# Patient Record
Sex: FEMALE | Race: OTHER | NOT HISPANIC OR LATINO
[De-identification: names, ages, dates, MRNs, and addresses within clinical notes are randomized per-mention and may not be internally consistent; named-entity substitution may affect disease eponyms.]

---
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
## 2021-01-07 ENCOUNTER — APPOINTMENT (OUTPATIENT)
Dept: NEUROLOGY | Facility: CLINIC | Age: 21
End: 2021-01-07
Payer: COMMERCIAL

## 2021-01-07 VITALS — TEMPERATURE: 98.2 F

## 2021-01-07 PROCEDURE — 95912 NRV CNDJ TEST 11-12 STUDIES: CPT

## 2021-01-07 PROCEDURE — 99072 ADDL SUPL MATRL&STAF TM PHE: CPT

## 2021-01-07 PROCEDURE — 95886 MUSC TEST DONE W/N TEST COMP: CPT

## 2021-01-10 LAB — SARS-COV-2 N GENE NPH QL NAA+PROBE: NOT DETECTED

## 2021-01-13 ENCOUNTER — FORM ENCOUNTER (OUTPATIENT)
Age: 21
End: 2021-01-13

## 2021-01-25 ENCOUNTER — LABORATORY RESULT (OUTPATIENT)
Age: 21
End: 2021-01-25

## 2021-01-25 ENCOUNTER — OUTPATIENT (OUTPATIENT)
Dept: OUTPATIENT SERVICES | Facility: HOSPITAL | Age: 21
LOS: 1 days | Discharge: HOME | End: 2021-01-25
Payer: COMMERCIAL

## 2021-01-25 DIAGNOSIS — R20.8 OTHER DISTURBANCES OF SKIN SENSATION: ICD-10-CM

## 2021-01-25 DIAGNOSIS — M54.5 LOW BACK PAIN: ICD-10-CM

## 2021-01-25 PROCEDURE — 72195 MRI PELVIS W/O DYE: CPT | Mod: 26

## 2021-01-25 PROCEDURE — 72148 MRI LUMBAR SPINE W/O DYE: CPT | Mod: 26

## 2021-03-09 ENCOUNTER — FORM ENCOUNTER (OUTPATIENT)
Age: 21
End: 2021-03-09

## 2021-03-10 ENCOUNTER — OUTPATIENT (OUTPATIENT)
Dept: OUTPATIENT SERVICES | Facility: HOSPITAL | Age: 21
LOS: 1 days | Discharge: HOME | End: 2021-03-10
Payer: COMMERCIAL

## 2021-03-10 ENCOUNTER — APPOINTMENT (OUTPATIENT)
Dept: PSYCHIATRY | Facility: CLINIC | Age: 21
End: 2021-03-10

## 2021-03-10 DIAGNOSIS — G47.33 OBSTRUCTIVE SLEEP APNEA (ADULT) (PEDIATRIC): ICD-10-CM

## 2021-03-10 DIAGNOSIS — F90.0 ATTENTION-DEFICIT HYPERACTIVITY DISORDER, PREDOMINANTLY INATTENTIVE TYPE: ICD-10-CM

## 2021-03-10 DIAGNOSIS — Z99.89 OBSTRUCTIVE SLEEP APNEA (ADULT) (PEDIATRIC): ICD-10-CM

## 2021-03-10 PROCEDURE — 90792 PSYCH DIAG EVAL W/MED SRVCS: CPT

## 2021-03-11 ENCOUNTER — APPOINTMENT (OUTPATIENT)
Dept: PSYCHIATRY | Facility: CLINIC | Age: 21
End: 2021-03-11

## 2021-03-17 ENCOUNTER — OUTPATIENT (OUTPATIENT)
Dept: OUTPATIENT SERVICES | Facility: HOSPITAL | Age: 21
LOS: 1 days | Discharge: HOME | End: 2021-03-17

## 2021-03-17 ENCOUNTER — APPOINTMENT (OUTPATIENT)
Dept: PSYCHIATRY | Facility: CLINIC | Age: 21
End: 2021-03-17
Payer: COMMERCIAL

## 2021-03-17 DIAGNOSIS — F90.0 ATTENTION-DEFICIT HYPERACTIVITY DISORDER, PREDOMINANTLY INATTENTIVE TYPE: ICD-10-CM

## 2021-03-17 PROCEDURE — 99213 OFFICE O/P EST LOW 20 MIN: CPT | Mod: 95

## 2021-03-26 ENCOUNTER — OUTPATIENT (OUTPATIENT)
Dept: OUTPATIENT SERVICES | Facility: HOSPITAL | Age: 21
LOS: 1 days | Discharge: HOME | End: 2021-03-26
Payer: COMMERCIAL

## 2021-03-26 DIAGNOSIS — N83.9 NONINFLAMMATORY DISORDER OF OVARY, FALLOPIAN TUBE AND BROAD LIGAMENT, UNSPECIFIED: ICD-10-CM

## 2021-03-26 PROCEDURE — 76856 US EXAM PELVIC COMPLETE: CPT | Mod: 26

## 2021-03-31 ENCOUNTER — APPOINTMENT (OUTPATIENT)
Dept: PSYCHIATRY | Facility: CLINIC | Age: 21
End: 2021-03-31
Payer: COMMERCIAL

## 2021-03-31 ENCOUNTER — OUTPATIENT (OUTPATIENT)
Dept: OUTPATIENT SERVICES | Facility: HOSPITAL | Age: 21
LOS: 1 days | Discharge: HOME | End: 2021-03-31

## 2021-03-31 DIAGNOSIS — F90.0 ATTENTION-DEFICIT HYPERACTIVITY DISORDER, PREDOMINANTLY INATTENTIVE TYPE: ICD-10-CM

## 2021-03-31 PROCEDURE — 99213 OFFICE O/P EST LOW 20 MIN: CPT | Mod: 95

## 2021-04-07 ENCOUNTER — OUTPATIENT (OUTPATIENT)
Dept: OUTPATIENT SERVICES | Facility: HOSPITAL | Age: 21
LOS: 1 days | Discharge: HOME | End: 2021-04-07

## 2021-04-07 ENCOUNTER — APPOINTMENT (OUTPATIENT)
Dept: PSYCHIATRY | Facility: CLINIC | Age: 21
End: 2021-04-07
Payer: COMMERCIAL

## 2021-04-07 DIAGNOSIS — R41.840 ATTENTION AND CONCENTRATION DEFICIT: ICD-10-CM

## 2021-04-07 PROCEDURE — 99213 OFFICE O/P EST LOW 20 MIN: CPT | Mod: 95

## 2021-04-07 RX ORDER — METHYLPHENIDATE HYDROCHLORIDE 18 MG/1
18 TABLET, EXTENDED RELEASE ORAL DAILY
Qty: 14 | Refills: 0 | Status: DISCONTINUED | OUTPATIENT
Start: 2021-03-22 | End: 2021-04-07

## 2021-04-08 DIAGNOSIS — F90.0 ATTENTION-DEFICIT HYPERACTIVITY DISORDER, PREDOMINANTLY INATTENTIVE TYPE: ICD-10-CM

## 2021-04-14 ENCOUNTER — OUTPATIENT (OUTPATIENT)
Dept: OUTPATIENT SERVICES | Facility: HOSPITAL | Age: 21
LOS: 1 days | Discharge: HOME | End: 2021-04-14

## 2021-04-14 ENCOUNTER — APPOINTMENT (OUTPATIENT)
Dept: PSYCHIATRY | Facility: CLINIC | Age: 21
End: 2021-04-14
Payer: COMMERCIAL

## 2021-04-14 DIAGNOSIS — F90.0 ATTENTION-DEFICIT HYPERACTIVITY DISORDER, PREDOMINANTLY INATTENTIVE TYPE: ICD-10-CM

## 2021-04-14 PROCEDURE — 90833 PSYTX W PT W E/M 30 MIN: CPT | Mod: 95

## 2021-04-14 PROCEDURE — 99213 OFFICE O/P EST LOW 20 MIN: CPT | Mod: 95

## 2021-04-28 ENCOUNTER — APPOINTMENT (OUTPATIENT)
Dept: PSYCHIATRY | Facility: CLINIC | Age: 21
End: 2021-04-28
Payer: COMMERCIAL

## 2021-04-28 ENCOUNTER — OUTPATIENT (OUTPATIENT)
Dept: OUTPATIENT SERVICES | Facility: HOSPITAL | Age: 21
LOS: 1 days | Discharge: HOME | End: 2021-04-28

## 2021-04-28 PROCEDURE — 99213 OFFICE O/P EST LOW 20 MIN: CPT | Mod: 95

## 2021-04-29 DIAGNOSIS — F90.0 ATTENTION-DEFICIT HYPERACTIVITY DISORDER, PREDOMINANTLY INATTENTIVE TYPE: ICD-10-CM

## 2021-05-19 ENCOUNTER — APPOINTMENT (OUTPATIENT)
Dept: PSYCHIATRY | Facility: CLINIC | Age: 21
End: 2021-05-19
Payer: COMMERCIAL

## 2021-05-19 ENCOUNTER — OUTPATIENT (OUTPATIENT)
Dept: OUTPATIENT SERVICES | Facility: HOSPITAL | Age: 21
LOS: 1 days | Discharge: HOME | End: 2021-05-19

## 2021-05-19 DIAGNOSIS — F41.9 ANXIETY DISORDER, UNSPECIFIED: ICD-10-CM

## 2021-05-19 DIAGNOSIS — F90.0 ATTENTION-DEFICIT HYPERACTIVITY DISORDER, PREDOMINANTLY INATTENTIVE TYPE: ICD-10-CM

## 2021-05-19 PROCEDURE — 99213 OFFICE O/P EST LOW 20 MIN: CPT | Mod: 95

## 2021-05-19 PROCEDURE — 90833 PSYTX W PT W E/M 30 MIN: CPT | Mod: 95

## 2021-06-09 ENCOUNTER — OUTPATIENT (OUTPATIENT)
Dept: OUTPATIENT SERVICES | Facility: HOSPITAL | Age: 21
LOS: 1 days | Discharge: HOME | End: 2021-06-09

## 2021-06-09 ENCOUNTER — APPOINTMENT (OUTPATIENT)
Dept: PSYCHIATRY | Facility: CLINIC | Age: 21
End: 2021-06-09
Payer: COMMERCIAL

## 2021-06-09 PROCEDURE — 99213 OFFICE O/P EST LOW 20 MIN: CPT | Mod: 95

## 2021-06-10 DIAGNOSIS — F90.0 ATTENTION-DEFICIT HYPERACTIVITY DISORDER, PREDOMINANTLY INATTENTIVE TYPE: ICD-10-CM

## 2021-06-10 DIAGNOSIS — F41.9 ANXIETY DISORDER, UNSPECIFIED: ICD-10-CM

## 2021-07-07 ENCOUNTER — APPOINTMENT (OUTPATIENT)
Dept: PSYCHIATRY | Facility: CLINIC | Age: 21
End: 2021-07-07
Payer: COMMERCIAL

## 2021-07-07 ENCOUNTER — TRANSCRIPTION ENCOUNTER (OUTPATIENT)
Age: 21
End: 2021-07-07

## 2021-07-07 ENCOUNTER — OUTPATIENT (OUTPATIENT)
Dept: OUTPATIENT SERVICES | Facility: HOSPITAL | Age: 21
LOS: 1 days | Discharge: HOME | End: 2021-07-07

## 2021-07-07 DIAGNOSIS — F90.9 ATTENTION-DEFICIT HYPERACTIVITY DISORDER, UNSPECIFIED TYPE: ICD-10-CM

## 2021-07-07 DIAGNOSIS — F41.9 ANXIETY DISORDER, UNSPECIFIED: ICD-10-CM

## 2021-07-07 PROCEDURE — 99213 OFFICE O/P EST LOW 20 MIN: CPT | Mod: 95

## 2021-08-04 ENCOUNTER — APPOINTMENT (OUTPATIENT)
Dept: PSYCHIATRY | Facility: CLINIC | Age: 21
End: 2021-08-04
Payer: COMMERCIAL

## 2021-08-04 ENCOUNTER — OUTPATIENT (OUTPATIENT)
Dept: OUTPATIENT SERVICES | Facility: HOSPITAL | Age: 21
LOS: 1 days | Discharge: HOME | End: 2021-08-04

## 2021-08-04 DIAGNOSIS — F41.9 ANXIETY DISORDER, UNSPECIFIED: ICD-10-CM

## 2021-08-04 DIAGNOSIS — F90.9 ATTENTION-DEFICIT HYPERACTIVITY DISORDER, UNSPECIFIED TYPE: ICD-10-CM

## 2021-08-04 PROCEDURE — 99213 OFFICE O/P EST LOW 20 MIN: CPT | Mod: 95

## 2021-08-18 ENCOUNTER — LABORATORY RESULT (OUTPATIENT)
Age: 21
End: 2021-08-18

## 2021-08-18 ENCOUNTER — APPOINTMENT (OUTPATIENT)
Dept: PSYCHIATRY | Facility: CLINIC | Age: 21
End: 2021-08-18

## 2021-08-19 ENCOUNTER — RESULT REVIEW (OUTPATIENT)
Age: 21
End: 2021-08-19

## 2021-08-19 ENCOUNTER — OUTPATIENT (OUTPATIENT)
Dept: OUTPATIENT SERVICES | Facility: HOSPITAL | Age: 21
LOS: 1 days | Discharge: HOME | End: 2021-08-19
Payer: COMMERCIAL

## 2021-08-19 DIAGNOSIS — R79.9 ABNORMAL FINDING OF BLOOD CHEMISTRY, UNSPECIFIED: ICD-10-CM

## 2021-08-19 PROCEDURE — 76700 US EXAM ABDOM COMPLETE: CPT | Mod: 26

## 2021-08-23 LAB
COVID-19 SPIKE DOMAIN ANTIBODY INTERPRETATION: POSITIVE
SARS-COV-2 AB SERPL IA-ACNC: >250 U/ML

## 2021-08-25 ENCOUNTER — APPOINTMENT (OUTPATIENT)
Dept: PSYCHIATRY | Facility: CLINIC | Age: 21
End: 2021-08-25
Payer: COMMERCIAL

## 2021-08-25 ENCOUNTER — OUTPATIENT (OUTPATIENT)
Dept: OUTPATIENT SERVICES | Facility: HOSPITAL | Age: 21
LOS: 1 days | Discharge: HOME | End: 2021-08-25

## 2021-08-25 DIAGNOSIS — F90.9 ATTENTION-DEFICIT HYPERACTIVITY DISORDER, UNSPECIFIED TYPE: ICD-10-CM

## 2021-08-25 DIAGNOSIS — F41.9 ANXIETY DISORDER, UNSPECIFIED: ICD-10-CM

## 2021-08-25 PROCEDURE — 99213 OFFICE O/P EST LOW 20 MIN: CPT | Mod: 95

## 2021-08-30 RX ORDER — DEXMETHYLPHENIDATE HYDROCHLORIDE 15 MG/1
15 CAPSULE, EXTENDED RELEASE ORAL DAILY
Qty: 30 | Refills: 0 | Status: ACTIVE | OUTPATIENT
Start: 2021-08-30

## 2021-09-22 DIAGNOSIS — F43.23 ADJUSTMENT DISORDER WITH MIXED ANXIETY AND DEPRESSED MOOD: ICD-10-CM

## 2021-09-23 ENCOUNTER — APPOINTMENT (OUTPATIENT)
Dept: NEUROPSYCHOLOGY | Facility: CLINIC | Age: 21
End: 2021-09-23

## 2021-09-30 ENCOUNTER — APPOINTMENT (OUTPATIENT)
Dept: NEUROPSYCHOLOGY | Facility: CLINIC | Age: 21
End: 2021-09-30

## 2021-10-05 ENCOUNTER — OUTPATIENT (OUTPATIENT)
Dept: OUTPATIENT SERVICES | Facility: HOSPITAL | Age: 21
LOS: 1 days | Discharge: HOME | End: 2021-10-05

## 2021-10-05 ENCOUNTER — APPOINTMENT (OUTPATIENT)
Dept: PSYCHIATRY | Facility: CLINIC | Age: 21
End: 2021-10-05
Payer: COMMERCIAL

## 2021-10-05 DIAGNOSIS — F41.9 ANXIETY DISORDER, UNSPECIFIED: ICD-10-CM

## 2021-10-05 DIAGNOSIS — F90.9 ATTENTION-DEFICIT HYPERACTIVITY DISORDER, UNSPECIFIED TYPE: ICD-10-CM

## 2021-10-05 PROCEDURE — 99213 OFFICE O/P EST LOW 20 MIN: CPT | Mod: 95

## 2021-10-07 ENCOUNTER — APPOINTMENT (OUTPATIENT)
Dept: NEUROPSYCHOLOGY | Facility: CLINIC | Age: 21
End: 2021-10-07

## 2021-10-14 ENCOUNTER — APPOINTMENT (OUTPATIENT)
Dept: NEUROPSYCHOLOGY | Facility: CLINIC | Age: 21
End: 2021-10-14

## 2021-10-21 ENCOUNTER — APPOINTMENT (OUTPATIENT)
Dept: NEUROPSYCHOLOGY | Facility: CLINIC | Age: 21
End: 2021-10-21

## 2021-10-28 ENCOUNTER — APPOINTMENT (OUTPATIENT)
Dept: NEUROPSYCHOLOGY | Facility: CLINIC | Age: 21
End: 2021-10-28

## 2021-11-04 ENCOUNTER — APPOINTMENT (OUTPATIENT)
Dept: NEUROPSYCHOLOGY | Facility: CLINIC | Age: 21
End: 2021-11-04

## 2021-11-11 ENCOUNTER — APPOINTMENT (OUTPATIENT)
Dept: NEUROPSYCHOLOGY | Facility: CLINIC | Age: 21
End: 2021-11-11

## 2021-11-16 ENCOUNTER — OUTPATIENT (OUTPATIENT)
Dept: OUTPATIENT SERVICES | Facility: HOSPITAL | Age: 21
LOS: 1 days | Discharge: HOME | End: 2021-11-16

## 2021-11-16 ENCOUNTER — APPOINTMENT (OUTPATIENT)
Dept: PSYCHIATRY | Facility: CLINIC | Age: 21
End: 2021-11-16
Payer: COMMERCIAL

## 2021-11-16 DIAGNOSIS — F90.9 ATTENTION-DEFICIT HYPERACTIVITY DISORDER, UNSPECIFIED TYPE: ICD-10-CM

## 2021-11-16 DIAGNOSIS — F41.9 ANXIETY DISORDER, UNSPECIFIED: ICD-10-CM

## 2021-11-16 PROCEDURE — 99213 OFFICE O/P EST LOW 20 MIN: CPT | Mod: 95

## 2021-11-18 ENCOUNTER — APPOINTMENT (OUTPATIENT)
Dept: NEUROPSYCHOLOGY | Facility: CLINIC | Age: 21
End: 2021-11-18

## 2021-12-02 ENCOUNTER — APPOINTMENT (OUTPATIENT)
Dept: NEUROPSYCHOLOGY | Facility: CLINIC | Age: 21
End: 2021-12-02

## 2021-12-09 ENCOUNTER — APPOINTMENT (OUTPATIENT)
Dept: NEUROPSYCHOLOGY | Facility: CLINIC | Age: 21
End: 2021-12-09

## 2021-12-14 ENCOUNTER — OUTPATIENT (OUTPATIENT)
Dept: OUTPATIENT SERVICES | Facility: HOSPITAL | Age: 21
LOS: 1 days | Discharge: HOME | End: 2021-12-14

## 2021-12-14 ENCOUNTER — APPOINTMENT (OUTPATIENT)
Dept: PSYCHIATRY | Facility: CLINIC | Age: 21
End: 2021-12-14
Payer: COMMERCIAL

## 2021-12-14 DIAGNOSIS — F90.9 ATTENTION-DEFICIT HYPERACTIVITY DISORDER, UNSPECIFIED TYPE: ICD-10-CM

## 2021-12-14 DIAGNOSIS — F41.9 ANXIETY DISORDER, UNSPECIFIED: ICD-10-CM

## 2021-12-14 PROCEDURE — 99213 OFFICE O/P EST LOW 20 MIN: CPT

## 2021-12-16 ENCOUNTER — APPOINTMENT (OUTPATIENT)
Dept: NEUROPSYCHOLOGY | Facility: CLINIC | Age: 21
End: 2021-12-16

## 2021-12-23 ENCOUNTER — APPOINTMENT (OUTPATIENT)
Dept: NEUROPSYCHOLOGY | Facility: CLINIC | Age: 21
End: 2021-12-23

## 2022-01-06 ENCOUNTER — APPOINTMENT (OUTPATIENT)
Dept: NEUROPSYCHOLOGY | Facility: CLINIC | Age: 22
End: 2022-01-06

## 2022-01-09 LAB — SARS-COV-2 N GENE NPH QL NAA+PROBE: NOT DETECTED

## 2022-01-11 ENCOUNTER — APPOINTMENT (OUTPATIENT)
Dept: PSYCHIATRY | Facility: CLINIC | Age: 22
End: 2022-01-11
Payer: COMMERCIAL

## 2022-01-11 ENCOUNTER — OUTPATIENT (OUTPATIENT)
Dept: OUTPATIENT SERVICES | Facility: HOSPITAL | Age: 22
LOS: 1 days | Discharge: HOME | End: 2022-01-11

## 2022-01-11 PROCEDURE — 99213 OFFICE O/P EST LOW 20 MIN: CPT | Mod: 95

## 2022-01-12 DIAGNOSIS — F90.9 ATTENTION-DEFICIT HYPERACTIVITY DISORDER, UNSPECIFIED TYPE: ICD-10-CM

## 2022-01-12 DIAGNOSIS — F41.9 ANXIETY DISORDER, UNSPECIFIED: ICD-10-CM

## 2022-01-13 ENCOUNTER — APPOINTMENT (OUTPATIENT)
Dept: NEUROPSYCHOLOGY | Facility: CLINIC | Age: 22
End: 2022-01-13

## 2022-01-20 ENCOUNTER — APPOINTMENT (OUTPATIENT)
Dept: NEUROPSYCHOLOGY | Facility: CLINIC | Age: 22
End: 2022-01-20

## 2022-01-20 ENCOUNTER — OUTPATIENT (OUTPATIENT)
Dept: OUTPATIENT SERVICES | Facility: HOSPITAL | Age: 22
LOS: 1 days | Discharge: HOME | End: 2022-01-20

## 2022-01-20 DIAGNOSIS — F41.9 ANXIETY DISORDER, UNSPECIFIED: ICD-10-CM

## 2022-01-20 DIAGNOSIS — F43.25 ADJUSTMENT DISORDER WITH MIXED DISTURBANCE OF EMOTIONS AND CONDUCT: ICD-10-CM

## 2022-01-20 DIAGNOSIS — F43.23 ADJUSTMENT DISORDER WITH MIXED ANXIETY AND DEPRESSED MOOD: ICD-10-CM

## 2022-01-27 ENCOUNTER — APPOINTMENT (OUTPATIENT)
Dept: NEUROPSYCHOLOGY | Facility: CLINIC | Age: 22
End: 2022-01-27

## 2022-02-03 ENCOUNTER — APPOINTMENT (OUTPATIENT)
Dept: NEUROPSYCHOLOGY | Facility: CLINIC | Age: 22
End: 2022-02-03

## 2022-02-08 ENCOUNTER — APPOINTMENT (OUTPATIENT)
Dept: PSYCHIATRY | Facility: CLINIC | Age: 22
End: 2022-02-08
Payer: COMMERCIAL

## 2022-02-08 ENCOUNTER — OUTPATIENT (OUTPATIENT)
Dept: OUTPATIENT SERVICES | Facility: HOSPITAL | Age: 22
LOS: 1 days | Discharge: HOME | End: 2022-02-08

## 2022-02-08 PROCEDURE — 99212 OFFICE O/P EST SF 10 MIN: CPT | Mod: 95

## 2022-02-09 DIAGNOSIS — F41.9 ANXIETY DISORDER, UNSPECIFIED: ICD-10-CM

## 2022-02-09 DIAGNOSIS — F90.9 ATTENTION-DEFICIT HYPERACTIVITY DISORDER, UNSPECIFIED TYPE: ICD-10-CM

## 2022-02-10 ENCOUNTER — APPOINTMENT (OUTPATIENT)
Dept: NEUROPSYCHOLOGY | Facility: CLINIC | Age: 22
End: 2022-02-10

## 2022-02-17 ENCOUNTER — APPOINTMENT (OUTPATIENT)
Dept: NEUROPSYCHOLOGY | Facility: CLINIC | Age: 22
End: 2022-02-17

## 2022-02-22 ENCOUNTER — APPOINTMENT (OUTPATIENT)
Age: 22
End: 2022-02-22
Payer: COMMERCIAL

## 2022-02-22 PROCEDURE — 99441: CPT | Mod: 95

## 2022-02-22 NOTE — HISTORY OF PRESENT ILLNESS
[Home] : at home, [unfilled] , at the time of the visit. [Medical Office: ([facility])___] : at the medical office located in  [Verbal consent obtained from patient] : the patient, [unfilled] [TextBox_4] : MICHAEL COMPLIANT AND BENEFITING, ISSUES WITH APRIA

## 2022-02-24 ENCOUNTER — APPOINTMENT (OUTPATIENT)
Dept: NEUROPSYCHOLOGY | Facility: CLINIC | Age: 22
End: 2022-02-24

## 2022-03-03 ENCOUNTER — APPOINTMENT (OUTPATIENT)
Dept: NEUROPSYCHOLOGY | Facility: CLINIC | Age: 22
End: 2022-03-03

## 2022-03-10 ENCOUNTER — APPOINTMENT (OUTPATIENT)
Dept: NEUROPSYCHOLOGY | Facility: CLINIC | Age: 22
End: 2022-03-10

## 2022-03-15 ENCOUNTER — APPOINTMENT (OUTPATIENT)
Dept: PSYCHIATRY | Facility: CLINIC | Age: 22
End: 2022-03-15
Payer: COMMERCIAL

## 2022-03-15 ENCOUNTER — OUTPATIENT (OUTPATIENT)
Dept: OUTPATIENT SERVICES | Facility: HOSPITAL | Age: 22
LOS: 1 days | Discharge: HOME | End: 2022-03-15

## 2022-03-15 PROCEDURE — 99213 OFFICE O/P EST LOW 20 MIN: CPT | Mod: 95

## 2022-03-17 ENCOUNTER — APPOINTMENT (OUTPATIENT)
Dept: NEUROPSYCHOLOGY | Facility: CLINIC | Age: 22
End: 2022-03-17

## 2022-03-17 DIAGNOSIS — F90.9 ATTENTION-DEFICIT HYPERACTIVITY DISORDER, UNSPECIFIED TYPE: ICD-10-CM

## 2022-03-17 DIAGNOSIS — F41.9 ANXIETY DISORDER, UNSPECIFIED: ICD-10-CM

## 2022-03-24 ENCOUNTER — APPOINTMENT (OUTPATIENT)
Dept: NEUROPSYCHOLOGY | Facility: CLINIC | Age: 22
End: 2022-03-24

## 2022-03-31 ENCOUNTER — APPOINTMENT (OUTPATIENT)
Dept: NEUROPSYCHOLOGY | Facility: CLINIC | Age: 22
End: 2022-03-31

## 2022-04-07 ENCOUNTER — APPOINTMENT (OUTPATIENT)
Dept: NEUROPSYCHOLOGY | Facility: CLINIC | Age: 22
End: 2022-04-07

## 2022-04-12 ENCOUNTER — APPOINTMENT (OUTPATIENT)
Dept: PSYCHIATRY | Facility: CLINIC | Age: 22
End: 2022-04-12
Payer: COMMERCIAL

## 2022-04-12 ENCOUNTER — OUTPATIENT (OUTPATIENT)
Dept: OUTPATIENT SERVICES | Facility: HOSPITAL | Age: 22
LOS: 1 days | Discharge: HOME | End: 2022-04-12

## 2022-04-12 PROCEDURE — 99213 OFFICE O/P EST LOW 20 MIN: CPT | Mod: 95

## 2022-04-13 DIAGNOSIS — F90.9 ATTENTION-DEFICIT HYPERACTIVITY DISORDER, UNSPECIFIED TYPE: ICD-10-CM

## 2022-04-13 DIAGNOSIS — F41.9 ANXIETY DISORDER, UNSPECIFIED: ICD-10-CM

## 2022-04-14 ENCOUNTER — APPOINTMENT (OUTPATIENT)
Dept: NEUROPSYCHOLOGY | Facility: CLINIC | Age: 22
End: 2022-04-14

## 2022-04-21 ENCOUNTER — APPOINTMENT (OUTPATIENT)
Dept: NEUROPSYCHOLOGY | Facility: CLINIC | Age: 22
End: 2022-04-21

## 2022-04-28 ENCOUNTER — APPOINTMENT (OUTPATIENT)
Dept: NEUROPSYCHOLOGY | Facility: CLINIC | Age: 22
End: 2022-04-28

## 2022-05-05 ENCOUNTER — APPOINTMENT (OUTPATIENT)
Dept: NEUROPSYCHOLOGY | Facility: CLINIC | Age: 22
End: 2022-05-05

## 2022-05-12 ENCOUNTER — APPOINTMENT (OUTPATIENT)
Dept: NEUROPSYCHOLOGY | Facility: CLINIC | Age: 22
End: 2022-05-12

## 2022-05-19 ENCOUNTER — APPOINTMENT (OUTPATIENT)
Dept: NEUROPSYCHOLOGY | Facility: CLINIC | Age: 22
End: 2022-05-19

## 2022-05-26 ENCOUNTER — APPOINTMENT (OUTPATIENT)
Dept: NEUROPSYCHOLOGY | Facility: CLINIC | Age: 22
End: 2022-05-26

## 2022-06-01 ENCOUNTER — OUTPATIENT (OUTPATIENT)
Dept: OUTPATIENT SERVICES | Facility: HOSPITAL | Age: 22
LOS: 1 days | Discharge: HOME | End: 2022-06-01

## 2022-06-01 ENCOUNTER — APPOINTMENT (OUTPATIENT)
Dept: PSYCHIATRY | Facility: CLINIC | Age: 22
End: 2022-06-01

## 2022-06-01 PROCEDURE — 99213 OFFICE O/P EST LOW 20 MIN: CPT | Mod: 95

## 2022-06-02 ENCOUNTER — APPOINTMENT (OUTPATIENT)
Dept: NEUROPSYCHOLOGY | Facility: CLINIC | Age: 22
End: 2022-06-02

## 2022-06-02 DIAGNOSIS — F90.9 ATTENTION-DEFICIT HYPERACTIVITY DISORDER, UNSPECIFIED TYPE: ICD-10-CM

## 2022-06-02 DIAGNOSIS — F41.9 ANXIETY DISORDER, UNSPECIFIED: ICD-10-CM

## 2022-06-09 ENCOUNTER — APPOINTMENT (OUTPATIENT)
Dept: NEUROPSYCHOLOGY | Facility: CLINIC | Age: 22
End: 2022-06-09

## 2022-06-16 ENCOUNTER — APPOINTMENT (OUTPATIENT)
Dept: NEUROPSYCHOLOGY | Facility: CLINIC | Age: 22
End: 2022-06-16

## 2022-06-23 ENCOUNTER — APPOINTMENT (OUTPATIENT)
Dept: NEUROPSYCHOLOGY | Facility: CLINIC | Age: 22
End: 2022-06-23

## 2022-06-30 ENCOUNTER — APPOINTMENT (OUTPATIENT)
Dept: NEUROPSYCHOLOGY | Facility: CLINIC | Age: 22
End: 2022-06-30

## 2022-07-14 ENCOUNTER — APPOINTMENT (OUTPATIENT)
Dept: NEUROPSYCHOLOGY | Facility: CLINIC | Age: 22
End: 2022-07-14

## 2022-07-20 ENCOUNTER — OUTPATIENT (OUTPATIENT)
Dept: OUTPATIENT SERVICES | Facility: HOSPITAL | Age: 22
LOS: 1 days | Discharge: HOME | End: 2022-07-20

## 2022-07-20 ENCOUNTER — APPOINTMENT (OUTPATIENT)
Dept: PSYCHIATRY | Facility: CLINIC | Age: 22
End: 2022-07-20

## 2022-07-20 PROCEDURE — 90833 PSYTX W PT W E/M 30 MIN: CPT | Mod: 95

## 2022-07-20 PROCEDURE — 99213 OFFICE O/P EST LOW 20 MIN: CPT | Mod: 95

## 2022-07-21 DIAGNOSIS — F41.9 ANXIETY DISORDER, UNSPECIFIED: ICD-10-CM

## 2022-07-21 DIAGNOSIS — F90.9 ATTENTION-DEFICIT HYPERACTIVITY DISORDER, UNSPECIFIED TYPE: ICD-10-CM

## 2022-07-28 ENCOUNTER — APPOINTMENT (OUTPATIENT)
Dept: NEUROPSYCHOLOGY | Facility: CLINIC | Age: 22
End: 2022-07-28

## 2022-08-01 ENCOUNTER — LABORATORY RESULT (OUTPATIENT)
Age: 22
End: 2022-08-01

## 2022-08-01 DIAGNOSIS — Z00.00 ENCOUNTER FOR GENERAL ADULT MEDICAL EXAMINATION W/OUT ABNORMAL FINDINGS: ICD-10-CM

## 2022-08-10 ENCOUNTER — APPOINTMENT (OUTPATIENT)
Dept: PSYCHIATRY | Facility: CLINIC | Age: 22
End: 2022-08-10

## 2022-08-11 ENCOUNTER — APPOINTMENT (OUTPATIENT)
Dept: NEUROPSYCHOLOGY | Facility: CLINIC | Age: 22
End: 2022-08-11

## 2022-08-25 ENCOUNTER — APPOINTMENT (OUTPATIENT)
Dept: NEUROPSYCHOLOGY | Facility: CLINIC | Age: 22
End: 2022-08-25

## 2022-09-08 ENCOUNTER — APPOINTMENT (OUTPATIENT)
Dept: NEUROPSYCHOLOGY | Facility: CLINIC | Age: 22
End: 2022-09-08

## 2022-09-22 ENCOUNTER — APPOINTMENT (OUTPATIENT)
Dept: NEUROPSYCHOLOGY | Facility: CLINIC | Age: 22
End: 2022-09-22

## 2022-09-30 ENCOUNTER — APPOINTMENT (OUTPATIENT)
Dept: NEUROPSYCHOLOGY | Facility: CLINIC | Age: 22
End: 2022-09-30

## 2022-10-06 ENCOUNTER — APPOINTMENT (OUTPATIENT)
Dept: NEUROPSYCHOLOGY | Facility: CLINIC | Age: 22
End: 2022-10-06

## 2022-10-07 ENCOUNTER — APPOINTMENT (OUTPATIENT)
Dept: CARDIOLOGY | Facility: CLINIC | Age: 22
End: 2022-10-07

## 2022-10-07 PROCEDURE — 93351 STRESS TTE COMPLETE: CPT

## 2022-10-14 ENCOUNTER — APPOINTMENT (OUTPATIENT)
Dept: NEUROPSYCHOLOGY | Facility: CLINIC | Age: 22
End: 2022-10-14

## 2022-10-20 ENCOUNTER — APPOINTMENT (OUTPATIENT)
Dept: NEUROPSYCHOLOGY | Facility: CLINIC | Age: 22
End: 2022-10-20

## 2022-10-28 ENCOUNTER — APPOINTMENT (OUTPATIENT)
Dept: NEUROPSYCHOLOGY | Facility: CLINIC | Age: 22
End: 2022-10-28

## 2022-11-03 ENCOUNTER — APPOINTMENT (OUTPATIENT)
Dept: NEUROPSYCHOLOGY | Facility: CLINIC | Age: 22
End: 2022-11-03

## 2022-11-11 ENCOUNTER — APPOINTMENT (OUTPATIENT)
Dept: NEUROPSYCHOLOGY | Facility: CLINIC | Age: 22
End: 2022-11-11

## 2022-11-17 ENCOUNTER — APPOINTMENT (OUTPATIENT)
Dept: NEUROPSYCHOLOGY | Facility: CLINIC | Age: 22
End: 2022-11-17

## 2022-11-25 ENCOUNTER — APPOINTMENT (OUTPATIENT)
Dept: NEUROPSYCHOLOGY | Facility: CLINIC | Age: 22
End: 2022-11-25

## 2022-12-01 ENCOUNTER — APPOINTMENT (OUTPATIENT)
Dept: NEUROPSYCHOLOGY | Facility: CLINIC | Age: 22
End: 2022-12-01

## 2022-12-04 DIAGNOSIS — J06.9 ACUTE UPPER RESPIRATORY INFECTION, UNSPECIFIED: ICD-10-CM

## 2022-12-04 RX ORDER — AZITHROMYCIN 250 MG/1
250 TABLET, FILM COATED ORAL
Qty: 6 | Refills: 0 | Status: ACTIVE | COMMUNITY
Start: 2022-12-04 | End: 1900-01-01

## 2022-12-15 ENCOUNTER — APPOINTMENT (OUTPATIENT)
Dept: NEUROPSYCHOLOGY | Facility: CLINIC | Age: 22
End: 2022-12-15

## 2022-12-23 ENCOUNTER — APPOINTMENT (OUTPATIENT)
Dept: NEUROPSYCHOLOGY | Facility: CLINIC | Age: 22
End: 2022-12-23

## 2022-12-29 ENCOUNTER — APPOINTMENT (OUTPATIENT)
Dept: NEUROPSYCHOLOGY | Facility: CLINIC | Age: 22
End: 2022-12-29

## 2023-01-06 ENCOUNTER — APPOINTMENT (OUTPATIENT)
Dept: NEUROPSYCHOLOGY | Facility: CLINIC | Age: 23
End: 2023-01-06

## 2023-01-06 LAB
25(OH)D3 SERPL-MCNC: 32 NG/ML
ALBUMIN SERPL ELPH-MCNC: 4 G/DL
ALP BLD-CCNC: 71 U/L
ALT SERPL-CCNC: 13 U/L
ANION GAP SERPL CALC-SCNC: 9 MMOL/L
AST SERPL-CCNC: 12 U/L
BASOPHILS # BLD AUTO: 0.04 K/UL
BASOPHILS NFR BLD AUTO: 0.3 %
BILIRUB SERPL-MCNC: <0.2 MG/DL
BUN SERPL-MCNC: 15 MG/DL
CALCIUM SERPL-MCNC: 9.5 MG/DL
CHLORIDE SERPL-SCNC: 103 MMOL/L
CHOLEST SERPL-MCNC: 184 MG/DL
CO2 SERPL-SCNC: 26 MMOL/L
CREAT SERPL-MCNC: 0.6 MG/DL
EGFR: 131 ML/MIN/1.73M2
EOSINOPHIL # BLD AUTO: 0.3 K/UL
EOSINOPHIL NFR BLD AUTO: 2.5 %
GLUCOSE SERPL-MCNC: 83 MG/DL
HCT VFR BLD CALC: 40.8 %
HDLC SERPL-MCNC: 50 MG/DL
HGB BLD-MCNC: 13.1 G/DL
IMM GRANULOCYTES NFR BLD AUTO: 0.7 %
LDLC SERPL CALC-MCNC: 99 MG/DL
LYMPHOCYTES # BLD AUTO: 4.75 K/UL
LYMPHOCYTES NFR BLD AUTO: 39.7 %
MAN DIFF?: NORMAL
MCHC RBC-ENTMCNC: 28.2 PG
MCHC RBC-ENTMCNC: 32.1 G/DL
MCV RBC AUTO: 87.9 FL
MONOCYTES # BLD AUTO: 0.76 K/UL
MONOCYTES NFR BLD AUTO: 6.3 %
NEUTROPHILS # BLD AUTO: 6.04 K/UL
NEUTROPHILS NFR BLD AUTO: 50.5 %
NONHDLC SERPL-MCNC: 134 MG/DL
PLATELET # BLD AUTO: 330 K/UL
POTASSIUM SERPL-SCNC: 4.3 MMOL/L
PROT SERPL-MCNC: 6.8 G/DL
RBC # BLD: 4.64 M/UL
RBC # FLD: 13.1 %
SODIUM SERPL-SCNC: 138 MMOL/L
T4 FREE SERPL-MCNC: 1.1 NG/DL
TRIGL SERPL-MCNC: 177 MG/DL
TSH SERPL-ACNC: 4.73 UIU/ML
WBC # FLD AUTO: 11.97 K/UL

## 2023-01-09 RX ORDER — AMOXICILLIN AND CLAVULANATE POTASSIUM 875; 125 MG/1; MG/1
875-125 TABLET, COATED ORAL TWICE DAILY
Qty: 20 | Refills: 0 | Status: ACTIVE | COMMUNITY
Start: 2018-06-12 | End: 1900-01-01

## 2023-01-12 LAB
APOLIPOPROTEIN B: 98 MG/DL
CHOLESTEROL, TOTAL: 176 MG/DL
CHOLESTEROL/HDL RATIO: 3.3
HDL CHOLESTEROL: 53 MG/DL
HLD.LARGE SERPL-SCNC: 8044 NMOL/L
LDL MEDIUM: 475 NMOL/L
LDL SERPL QN: 214.4
LDL SERPL-SCNC: 1655 NMOL/L
LDL SMALL SERPL-SCNC: 428 NMOL/L
LDLC REAL SIZE PAT SERPL: ABNORMAL
LDLC SERPL CALC-MCNC: 96
LIPOPROTEIN (A): 246 NMOL/L
NON-HDL CHOLESTEROL: 123
TRIGLYCERIDES: 167 MG/DL

## 2023-01-20 ENCOUNTER — APPOINTMENT (OUTPATIENT)
Dept: NEUROPSYCHOLOGY | Facility: CLINIC | Age: 23
End: 2023-01-20

## 2023-01-27 ENCOUNTER — OUTPATIENT (OUTPATIENT)
Dept: OUTPATIENT SERVICES | Facility: HOSPITAL | Age: 23
LOS: 1 days | End: 2023-01-27

## 2023-01-27 ENCOUNTER — APPOINTMENT (OUTPATIENT)
Dept: NEUROPSYCHOLOGY | Facility: CLINIC | Age: 23
End: 2023-01-27

## 2023-01-27 DIAGNOSIS — F41.9 ANXIETY DISORDER, UNSPECIFIED: ICD-10-CM

## 2023-01-27 DIAGNOSIS — F43.25 ADJUSTMENT DISORDER WITH MIXED DISTURBANCE OF EMOTIONS AND CONDUCT: ICD-10-CM

## 2023-01-27 DIAGNOSIS — F43.23 ADJUSTMENT DISORDER WITH MIXED ANXIETY AND DEPRESSED MOOD: ICD-10-CM

## 2023-01-30 ENCOUNTER — APPOINTMENT (OUTPATIENT)
Dept: NEUROPSYCHOLOGY | Facility: CLINIC | Age: 23
End: 2023-01-30

## 2023-02-03 ENCOUNTER — APPOINTMENT (OUTPATIENT)
Dept: NEUROPSYCHOLOGY | Facility: CLINIC | Age: 23
End: 2023-02-03

## 2023-02-10 ENCOUNTER — OUTPATIENT (OUTPATIENT)
Dept: OUTPATIENT SERVICES | Facility: HOSPITAL | Age: 23
LOS: 1 days | Discharge: HOME | End: 2023-02-10
Payer: COMMERCIAL

## 2023-02-10 ENCOUNTER — APPOINTMENT (OUTPATIENT)
Dept: NEUROPSYCHOLOGY | Facility: CLINIC | Age: 23
End: 2023-02-10

## 2023-02-10 DIAGNOSIS — F43.23 ADJUSTMENT DISORDER WITH MIXED ANXIETY AND DEPRESSED MOOD: ICD-10-CM

## 2023-02-10 PROCEDURE — 90837 PSYTX W PT 60 MINUTES: CPT | Mod: 95

## 2023-02-11 DIAGNOSIS — F43.23 ADJUSTMENT DISORDER WITH MIXED ANXIETY AND DEPRESSED MOOD: ICD-10-CM

## 2023-02-17 ENCOUNTER — APPOINTMENT (OUTPATIENT)
Dept: NEUROPSYCHOLOGY | Facility: CLINIC | Age: 23
End: 2023-02-17

## 2023-02-27 ENCOUNTER — APPOINTMENT (OUTPATIENT)
Dept: NEUROPSYCHOLOGY | Facility: CLINIC | Age: 23
End: 2023-02-27

## 2023-03-03 ENCOUNTER — APPOINTMENT (OUTPATIENT)
Dept: NEUROPSYCHOLOGY | Facility: CLINIC | Age: 23
End: 2023-03-03

## 2023-03-03 ENCOUNTER — OUTPATIENT (OUTPATIENT)
Dept: OUTPATIENT SERVICES | Facility: HOSPITAL | Age: 23
LOS: 1 days | End: 2023-03-03
Payer: COMMERCIAL

## 2023-03-03 DIAGNOSIS — F43.23 ADJUSTMENT DISORDER WITH MIXED ANXIETY AND DEPRESSED MOOD: ICD-10-CM

## 2023-03-03 PROCEDURE — 90837 PSYTX W PT 60 MINUTES: CPT | Mod: 95

## 2023-03-10 ENCOUNTER — APPOINTMENT (OUTPATIENT)
Dept: NEUROPSYCHOLOGY | Facility: CLINIC | Age: 23
End: 2023-03-10

## 2023-03-13 ENCOUNTER — APPOINTMENT (OUTPATIENT)
Dept: NEUROPSYCHOLOGY | Facility: CLINIC | Age: 23
End: 2023-03-13

## 2023-03-17 ENCOUNTER — APPOINTMENT (OUTPATIENT)
Dept: NEUROPSYCHOLOGY | Facility: CLINIC | Age: 23
End: 2023-03-17

## 2023-03-17 ENCOUNTER — OUTPATIENT (OUTPATIENT)
Dept: OUTPATIENT SERVICES | Facility: HOSPITAL | Age: 23
LOS: 1 days | End: 2023-03-17

## 2023-03-17 DIAGNOSIS — F43.23 ADJUSTMENT DISORDER WITH MIXED ANXIETY AND DEPRESSED MOOD: ICD-10-CM

## 2023-03-24 ENCOUNTER — APPOINTMENT (OUTPATIENT)
Dept: NEUROPSYCHOLOGY | Facility: CLINIC | Age: 23
End: 2023-03-24

## 2023-03-27 ENCOUNTER — APPOINTMENT (OUTPATIENT)
Dept: NEUROPSYCHOLOGY | Facility: CLINIC | Age: 23
End: 2023-03-27

## 2023-03-31 ENCOUNTER — APPOINTMENT (OUTPATIENT)
Dept: NEUROPSYCHOLOGY | Facility: CLINIC | Age: 23
End: 2023-03-31

## 2023-03-31 ENCOUNTER — OUTPATIENT (OUTPATIENT)
Dept: OUTPATIENT SERVICES | Facility: HOSPITAL | Age: 23
LOS: 1 days | End: 2023-03-31
Payer: COMMERCIAL

## 2023-03-31 DIAGNOSIS — G31.84 MILD COGNITIVE IMPAIRMENT OF UNCERTAIN OR UNKNOWN ETIOLOGY: ICD-10-CM

## 2023-03-31 PROCEDURE — 90837 PSYTX W PT 60 MINUTES: CPT | Mod: 95

## 2023-04-01 DIAGNOSIS — G31.84 MILD COGNITIVE IMPAIRMENT OF UNCERTAIN OR UNKNOWN ETIOLOGY: ICD-10-CM

## 2023-04-07 ENCOUNTER — APPOINTMENT (OUTPATIENT)
Dept: NEUROPSYCHOLOGY | Facility: CLINIC | Age: 23
End: 2023-04-07

## 2023-04-10 ENCOUNTER — APPOINTMENT (OUTPATIENT)
Dept: NEUROPSYCHOLOGY | Facility: CLINIC | Age: 23
End: 2023-04-10

## 2023-04-14 ENCOUNTER — APPOINTMENT (OUTPATIENT)
Dept: NEUROPSYCHOLOGY | Facility: CLINIC | Age: 23
End: 2023-04-14

## 2023-04-21 ENCOUNTER — APPOINTMENT (OUTPATIENT)
Dept: NEUROPSYCHOLOGY | Facility: CLINIC | Age: 23
End: 2023-04-21

## 2023-04-24 ENCOUNTER — APPOINTMENT (OUTPATIENT)
Dept: NEUROPSYCHOLOGY | Facility: CLINIC | Age: 23
End: 2023-04-24

## 2023-04-26 ENCOUNTER — APPOINTMENT (OUTPATIENT)
Dept: CARDIOLOGY | Facility: CLINIC | Age: 23
End: 2023-04-26
Payer: COMMERCIAL

## 2023-04-26 DIAGNOSIS — F90.0 ATTENTION-DEFICIT HYPERACTIVITY DISORDER, PREDOMINANTLY INATTENTIVE TYPE: ICD-10-CM

## 2023-04-26 DIAGNOSIS — G47.33 OBSTRUCTIVE SLEEP APNEA (ADULT) (PEDIATRIC): ICD-10-CM

## 2023-04-26 PROCEDURE — 99205 OFFICE O/P NEW HI 60 MIN: CPT | Mod: 95

## 2023-04-28 ENCOUNTER — APPOINTMENT (OUTPATIENT)
Dept: NEUROPSYCHOLOGY | Facility: CLINIC | Age: 23
End: 2023-04-28

## 2023-04-28 ENCOUNTER — OUTPATIENT (OUTPATIENT)
Dept: OUTPATIENT SERVICES | Facility: HOSPITAL | Age: 23
LOS: 1 days | End: 2023-04-28
Payer: COMMERCIAL

## 2023-04-28 DIAGNOSIS — G31.84 MILD COGNITIVE IMPAIRMENT OF UNCERTAIN OR UNKNOWN ETIOLOGY: ICD-10-CM

## 2023-04-28 PROCEDURE — 90837 PSYTX W PT 60 MINUTES: CPT | Mod: 95

## 2023-05-05 ENCOUNTER — APPOINTMENT (OUTPATIENT)
Dept: NEUROPSYCHOLOGY | Facility: CLINIC | Age: 23
End: 2023-05-05

## 2023-05-08 ENCOUNTER — APPOINTMENT (OUTPATIENT)
Dept: NEUROPSYCHOLOGY | Facility: CLINIC | Age: 23
End: 2023-05-08

## 2023-05-12 ENCOUNTER — APPOINTMENT (OUTPATIENT)
Dept: NEUROPSYCHOLOGY | Facility: CLINIC | Age: 23
End: 2023-05-12

## 2023-05-15 ENCOUNTER — APPOINTMENT (OUTPATIENT)
Dept: CARDIOLOGY | Facility: CLINIC | Age: 23
End: 2023-05-15
Payer: COMMERCIAL

## 2023-05-15 PROCEDURE — 97802 MEDICAL NUTRITION INDIV IN: CPT | Mod: 95

## 2023-05-16 VITALS — WEIGHT: 167 LBS | BODY MASS INDEX: 30.73 KG/M2 | HEIGHT: 62 IN

## 2023-05-17 ENCOUNTER — APPOINTMENT (OUTPATIENT)
Dept: OTOLARYNGOLOGY | Facility: CLINIC | Age: 23
End: 2023-05-17
Payer: COMMERCIAL

## 2023-05-17 ENCOUNTER — LABORATORY RESULT (OUTPATIENT)
Age: 23
End: 2023-05-17

## 2023-05-17 VITALS — HEIGHT: 62 IN | WEIGHT: 175 LBS | BODY MASS INDEX: 32.2 KG/M2

## 2023-05-17 DIAGNOSIS — H60.502 UNSPECIFIED ACUTE NONINFECTIVE OTITIS EXTERNA, LEFT EAR: ICD-10-CM

## 2023-05-17 DIAGNOSIS — R05.9 COUGH, UNSPECIFIED: ICD-10-CM

## 2023-05-17 PROCEDURE — 99214 OFFICE O/P EST MOD 30 MIN: CPT | Mod: 25

## 2023-05-17 PROCEDURE — 31575 DIAGNOSTIC LARYNGOSCOPY: CPT

## 2023-05-17 PROCEDURE — 69210 REMOVE IMPACTED EAR WAX UNI: CPT

## 2023-05-17 RX ORDER — OFLOXACIN OTIC 3 MG/ML
0.3 SOLUTION AURICULAR (OTIC)
Qty: 1 | Refills: 0 | Status: ACTIVE | COMMUNITY
Start: 2023-05-17 | End: 1900-01-01

## 2023-05-17 NOTE — HISTORY OF PRESENT ILLNESS
[FreeTextEntry1] : Patient returns today c/o otitis externa, left ear discomfort, TMJ, throat discomfort. Past two weeks patient has been sick. Left ear clogged with wax, discharged and pus. Prescribed antibiotics drops, having itchiness, no discharge. Noticed a change in hearing in left ear. Developed a cough and is still coughing. Occasionally having throat dryness, occasional clear or yellow phlegm. Started taking Levothyroxine 25mcg.

## 2023-05-17 NOTE — REASON FOR VISIT
[Subsequent Evaluation] : a subsequent evaluation for [FreeTextEntry2] : otitis externa, right ear pain, TMJ

## 2023-05-17 NOTE — PHYSICAL EXAM
[de-identified] : bilateral impacted wax cleaned with microsuction. Left OE debrided.  [Normal] : mucosa is normal [Midline] : trachea located in midline position

## 2023-05-19 ENCOUNTER — APPOINTMENT (OUTPATIENT)
Dept: NEUROPSYCHOLOGY | Facility: HOSPITAL | Age: 23
End: 2023-05-19

## 2023-05-19 ENCOUNTER — APPOINTMENT (OUTPATIENT)
Dept: NEUROPSYCHOLOGY | Facility: CLINIC | Age: 23
End: 2023-05-19

## 2023-05-19 ENCOUNTER — OUTPATIENT (OUTPATIENT)
Dept: OUTPATIENT SERVICES | Facility: HOSPITAL | Age: 23
LOS: 1 days | End: 2023-05-19
Payer: COMMERCIAL

## 2023-05-19 DIAGNOSIS — G31.84 MILD COGNITIVE IMPAIRMENT OF UNCERTAIN OR UNKNOWN ETIOLOGY: ICD-10-CM

## 2023-05-19 DIAGNOSIS — F43.23 ADJUSTMENT DISORDER WITH MIXED ANXIETY AND DEPRESSED MOOD: ICD-10-CM

## 2023-05-19 PROCEDURE — 90834 PSYTX W PT 45 MINUTES: CPT | Mod: 95

## 2023-05-20 DIAGNOSIS — F43.23 ADJUSTMENT DISORDER WITH MIXED ANXIETY AND DEPRESSED MOOD: ICD-10-CM

## 2023-05-22 ENCOUNTER — APPOINTMENT (OUTPATIENT)
Dept: NEUROPSYCHOLOGY | Facility: CLINIC | Age: 23
End: 2023-05-22

## 2023-05-22 RX ORDER — ACETIC ACID 20 MG/ML
2 SOLUTION AURICULAR (OTIC)
Qty: 1 | Refills: 0 | Status: ACTIVE | COMMUNITY
Start: 2020-06-05 | End: 1900-01-01

## 2023-05-26 ENCOUNTER — APPOINTMENT (OUTPATIENT)
Dept: NEUROPSYCHOLOGY | Facility: CLINIC | Age: 23
End: 2023-05-26

## 2023-05-31 ENCOUNTER — OUTPATIENT (OUTPATIENT)
Dept: OUTPATIENT SERVICES | Facility: HOSPITAL | Age: 23
LOS: 1 days | End: 2023-05-31
Payer: COMMERCIAL

## 2023-05-31 ENCOUNTER — APPOINTMENT (OUTPATIENT)
Dept: NEUROPSYCHOLOGY | Facility: HOSPITAL | Age: 23
End: 2023-05-31

## 2023-05-31 DIAGNOSIS — F43.23 ADJUSTMENT DISORDER WITH MIXED ANXIETY AND DEPRESSED MOOD: ICD-10-CM

## 2023-05-31 PROCEDURE — 90837 PSYTX W PT 60 MINUTES: CPT | Mod: 95

## 2023-06-01 DIAGNOSIS — F43.23 ADJUSTMENT DISORDER WITH MIXED ANXIETY AND DEPRESSED MOOD: ICD-10-CM

## 2023-06-02 ENCOUNTER — APPOINTMENT (OUTPATIENT)
Dept: NEUROPSYCHOLOGY | Facility: CLINIC | Age: 23
End: 2023-06-02

## 2023-06-05 ENCOUNTER — APPOINTMENT (OUTPATIENT)
Dept: NEUROPSYCHOLOGY | Facility: CLINIC | Age: 23
End: 2023-06-05

## 2023-06-09 ENCOUNTER — APPOINTMENT (OUTPATIENT)
Dept: NEUROPSYCHOLOGY | Facility: CLINIC | Age: 23
End: 2023-06-09

## 2023-06-14 ENCOUNTER — APPOINTMENT (OUTPATIENT)
Dept: NEUROPSYCHOLOGY | Facility: HOSPITAL | Age: 23
End: 2023-06-14

## 2023-06-16 ENCOUNTER — APPOINTMENT (OUTPATIENT)
Dept: NEUROPSYCHOLOGY | Facility: CLINIC | Age: 23
End: 2023-06-16

## 2023-06-19 ENCOUNTER — APPOINTMENT (OUTPATIENT)
Dept: OTOLARYNGOLOGY | Facility: CLINIC | Age: 23
End: 2023-06-19
Payer: COMMERCIAL

## 2023-06-19 ENCOUNTER — APPOINTMENT (OUTPATIENT)
Dept: NEUROPSYCHOLOGY | Facility: CLINIC | Age: 23
End: 2023-06-19

## 2023-06-19 VITALS — HEIGHT: 62 IN | BODY MASS INDEX: 32.57 KG/M2 | WEIGHT: 177 LBS

## 2023-06-19 DIAGNOSIS — H61.23 IMPACTED CERUMEN, BILATERAL: ICD-10-CM

## 2023-06-19 PROCEDURE — 99213 OFFICE O/P EST LOW 20 MIN: CPT | Mod: 25

## 2023-06-19 PROCEDURE — 69210 REMOVE IMPACTED EAR WAX UNI: CPT

## 2023-06-19 NOTE — PHYSICAL EXAM
[Midline] : trachea located in midline position [de-identified] : R jaw tenderness on palpation  [de-identified] : Bilateral  obstructing and impacted cerumen removed under otomicroscopy with microinstrumentation. Canal edema with purulent discharge, CSF powder applied [Normal] : no abnormal secretions

## 2023-06-19 NOTE — REASON FOR VISIT
[Subsequent Evaluation] : a subsequent evaluation for [FreeTextEntry2] : acute otitis externa of left ear, coughing

## 2023-06-19 NOTE — HISTORY OF PRESENT ILLNESS
[FreeTextEntry1] : Patient returns today c/o acute otitis externa of left ear, cough. States that she has itchiness in bilateral ears. Left ear pus and right ear pain worsened starting last night. Having right sided jaw pain. Used Ofloxacin with improvement however symptoms returned. Having post nasal drip, using Flonase with some improvement.

## 2023-06-19 NOTE — ASSESSMENT
[FreeTextEntry1] : Debrided today\par Dry ear precautions\par CSF powder applied\par RV 1-2 weeks with myself or Dr. Costa

## 2023-06-23 ENCOUNTER — APPOINTMENT (OUTPATIENT)
Dept: NEUROPSYCHOLOGY | Facility: CLINIC | Age: 23
End: 2023-06-23

## 2023-06-26 ENCOUNTER — APPOINTMENT (OUTPATIENT)
Dept: OTOLARYNGOLOGY | Facility: CLINIC | Age: 23
End: 2023-06-26
Payer: COMMERCIAL

## 2023-06-26 DIAGNOSIS — H60.8X3 OTHER OTITIS EXTERNA, BILATERAL: ICD-10-CM

## 2023-06-26 DIAGNOSIS — H60.393 OTHER INFECTIVE OTITIS EXTERNA, BILATERAL: ICD-10-CM

## 2023-06-26 PROCEDURE — 69210 REMOVE IMPACTED EAR WAX UNI: CPT

## 2023-06-26 PROCEDURE — 99213 OFFICE O/P EST LOW 20 MIN: CPT | Mod: 25

## 2023-06-26 RX ORDER — CLOTRIMAZOLE AND BETAMETHASONE DIPROPIONATE 10; .5 MG/G; MG/G
1-0.05 CREAM TOPICAL
Qty: 1 | Refills: 2 | Status: ACTIVE | COMMUNITY
Start: 2023-06-26 | End: 1900-01-01

## 2023-06-26 NOTE — REASON FOR VISIT
[Subsequent Evaluation] : a subsequent evaluation for [FreeTextEntry2] : acute otitis externa of  both ears

## 2023-06-26 NOTE — HISTORY OF PRESENT ILLNESS
[FreeTextEntry1] : Patient returns today c/o acute otitis externa of  both ears . She has been using  ciprofloxacin drops for 1 week.  She has  improvement with  pain and hearing .  She  has misplaced drops  last night, wasn't able to apply last night and this morning. \par Reports PND, uses Flonase and Allertec with slight improvement. \par C/o of acid reflux and cough, takes prilosec as needed with improvement.

## 2023-06-26 NOTE — PHYSICAL EXAM
[de-identified] : Bilateral  obstructing and impacted cerumen removed under otomicroscopy with microinstrumentation. Canal edema with purulent discharge, CSF powder applied [Normal] : mucosa is normal [Midline] : trachea located in midline position

## 2023-06-28 ENCOUNTER — APPOINTMENT (OUTPATIENT)
Dept: NEUROPSYCHOLOGY | Facility: HOSPITAL | Age: 23
End: 2023-06-28

## 2023-06-29 RX ORDER — LEVOTHYROXINE SODIUM 25 UG/1
25 TABLET ORAL DAILY
Qty: 90 | Refills: 2 | Status: ACTIVE | COMMUNITY
Start: 2023-06-29 | End: 1900-01-01

## 2023-06-30 ENCOUNTER — APPOINTMENT (OUTPATIENT)
Dept: NEUROPSYCHOLOGY | Facility: CLINIC | Age: 23
End: 2023-06-30

## 2023-07-12 ENCOUNTER — APPOINTMENT (OUTPATIENT)
Dept: NEUROPSYCHOLOGY | Facility: HOSPITAL | Age: 23
End: 2023-07-12

## 2023-07-20 ENCOUNTER — APPOINTMENT (OUTPATIENT)
Dept: PODIATRY | Facility: CLINIC | Age: 23
End: 2023-07-20
Payer: COMMERCIAL

## 2023-07-20 VITALS — BODY MASS INDEX: 32.57 KG/M2 | OXYGEN SATURATION: 99 % | HEIGHT: 62 IN | WEIGHT: 177 LBS | HEART RATE: 80 BPM

## 2023-07-20 DIAGNOSIS — M79.672 PAIN IN LEFT FOOT: ICD-10-CM

## 2023-07-20 DIAGNOSIS — M21.41 FLAT FOOT [PES PLANUS] (ACQUIRED), RIGHT FOOT: ICD-10-CM

## 2023-07-20 DIAGNOSIS — M21.42 FLAT FOOT [PES PLANUS] (ACQUIRED), LEFT FOOT: ICD-10-CM

## 2023-07-20 DIAGNOSIS — G89.29 PAIN IN RIGHT FOOT: ICD-10-CM

## 2023-07-20 DIAGNOSIS — G89.29 PAIN IN LEFT FOOT: ICD-10-CM

## 2023-07-20 DIAGNOSIS — M79.671 PAIN IN RIGHT FOOT: ICD-10-CM

## 2023-07-20 PROCEDURE — 99203 OFFICE O/P NEW LOW 30 MIN: CPT

## 2023-07-20 NOTE — PHYSICAL EXAM
[Pes Planus] : pes planus deformity [Bhanu's Test For Radial Artery Patency Bilaterally] : negative bilateral [de-identified] : Semi-r educable pes planus B/L feet\par Collapsed Arch B/L feet \par Gastroc Equinus B/L LE

## 2023-07-20 NOTE — HISTORY OF PRESENT ILLNESS
[FreeTextEntry1] : B/L Flat Feet\par - long Standing\par - Treated with costume made orthotics \par - Occasionally B/L LE pain with activity

## 2023-07-26 ENCOUNTER — APPOINTMENT (OUTPATIENT)
Dept: NEUROPSYCHOLOGY | Facility: HOSPITAL | Age: 23
End: 2023-07-26

## 2023-07-26 ENCOUNTER — OUTPATIENT (OUTPATIENT)
Dept: OUTPATIENT SERVICES | Facility: HOSPITAL | Age: 23
LOS: 1 days | End: 2023-07-26
Payer: COMMERCIAL

## 2023-07-26 DIAGNOSIS — F43.23 ADJUSTMENT DISORDER WITH MIXED ANXIETY AND DEPRESSED MOOD: ICD-10-CM

## 2023-07-26 PROCEDURE — 90837 PSYTX W PT 60 MINUTES: CPT | Mod: 95

## 2023-08-08 ENCOUNTER — APPOINTMENT (OUTPATIENT)
Dept: NEUROPSYCHOLOGY | Facility: HOSPITAL | Age: 23
End: 2023-08-08

## 2023-08-09 ENCOUNTER — APPOINTMENT (OUTPATIENT)
Dept: NEUROPSYCHOLOGY | Facility: HOSPITAL | Age: 23
End: 2023-08-09

## 2023-08-09 ENCOUNTER — OUTPATIENT (OUTPATIENT)
Dept: OUTPATIENT SERVICES | Facility: HOSPITAL | Age: 23
LOS: 1 days | End: 2023-08-09
Payer: COMMERCIAL

## 2023-08-09 DIAGNOSIS — F43.23 ADJUSTMENT DISORDER WITH MIXED ANXIETY AND DEPRESSED MOOD: ICD-10-CM

## 2023-08-09 PROCEDURE — 90837 PSYTX W PT 60 MINUTES: CPT | Mod: 95

## 2023-08-10 DIAGNOSIS — F43.23 ADJUSTMENT DISORDER WITH MIXED ANXIETY AND DEPRESSED MOOD: ICD-10-CM

## 2023-08-23 ENCOUNTER — APPOINTMENT (OUTPATIENT)
Dept: NEUROPSYCHOLOGY | Facility: HOSPITAL | Age: 23
End: 2023-08-23

## 2023-08-29 ENCOUNTER — APPOINTMENT (OUTPATIENT)
Dept: NEUROPSYCHOLOGY | Facility: HOSPITAL | Age: 23
End: 2023-08-29

## 2023-08-29 ENCOUNTER — OUTPATIENT (OUTPATIENT)
Dept: OUTPATIENT SERVICES | Facility: HOSPITAL | Age: 23
LOS: 1 days | End: 2023-08-29
Payer: COMMERCIAL

## 2023-08-29 DIAGNOSIS — F50.9 EATING DISORDER, UNSPECIFIED: ICD-10-CM

## 2023-08-29 DIAGNOSIS — F43.23 ADJUSTMENT DISORDER WITH MIXED ANXIETY AND DEPRESSED MOOD: ICD-10-CM

## 2023-08-29 PROCEDURE — 90837 PSYTX W PT 60 MINUTES: CPT | Mod: 95

## 2023-08-30 DIAGNOSIS — F43.23 ADJUSTMENT DISORDER WITH MIXED ANXIETY AND DEPRESSED MOOD: ICD-10-CM

## 2023-09-06 ENCOUNTER — APPOINTMENT (OUTPATIENT)
Dept: NEUROPSYCHOLOGY | Facility: HOSPITAL | Age: 23
End: 2023-09-06

## 2023-09-12 ENCOUNTER — APPOINTMENT (OUTPATIENT)
Dept: NEUROPSYCHOLOGY | Facility: HOSPITAL | Age: 23
End: 2023-09-12

## 2023-09-20 ENCOUNTER — APPOINTMENT (OUTPATIENT)
Dept: NEUROPSYCHOLOGY | Facility: HOSPITAL | Age: 23
End: 2023-09-20

## 2023-09-26 ENCOUNTER — APPOINTMENT (OUTPATIENT)
Dept: NEUROPSYCHOLOGY | Facility: HOSPITAL | Age: 23
End: 2023-09-26

## 2023-09-26 ENCOUNTER — OUTPATIENT (OUTPATIENT)
Dept: OUTPATIENT SERVICES | Facility: HOSPITAL | Age: 23
LOS: 1 days | End: 2023-09-26
Payer: COMMERCIAL

## 2023-09-26 DIAGNOSIS — F43.23 ADJUSTMENT DISORDER WITH MIXED ANXIETY AND DEPRESSED MOOD: ICD-10-CM

## 2023-09-26 PROCEDURE — 90837 PSYTX W PT 60 MINUTES: CPT | Mod: 95

## 2023-09-27 DIAGNOSIS — F43.23 ADJUSTMENT DISORDER WITH MIXED ANXIETY AND DEPRESSED MOOD: ICD-10-CM

## 2023-10-10 ENCOUNTER — APPOINTMENT (OUTPATIENT)
Dept: NEUROPSYCHOLOGY | Facility: HOSPITAL | Age: 23
End: 2023-10-10

## 2023-10-10 PROBLEM — E78.1 HIGH TRIGLYCERIDES: Status: ACTIVE | Noted: 2022-08-10

## 2023-10-13 ENCOUNTER — APPOINTMENT (OUTPATIENT)
Dept: CARDIOLOGY | Facility: CLINIC | Age: 23
End: 2023-10-13
Payer: COMMERCIAL

## 2023-10-13 DIAGNOSIS — R07.89 OTHER CHEST PAIN: ICD-10-CM

## 2023-10-13 DIAGNOSIS — E78.1 PURE HYPERGLYCERIDEMIA: ICD-10-CM

## 2023-10-13 PROCEDURE — 99215 OFFICE O/P EST HI 40 MIN: CPT | Mod: 95

## 2023-11-24 ENCOUNTER — RESULT REVIEW (OUTPATIENT)
Age: 23
End: 2023-11-24

## 2023-11-24 ENCOUNTER — OUTPATIENT (OUTPATIENT)
Dept: OUTPATIENT SERVICES | Facility: HOSPITAL | Age: 23
LOS: 1 days | End: 2023-11-24
Payer: COMMERCIAL

## 2023-11-24 DIAGNOSIS — Z00.8 ENCOUNTER FOR OTHER GENERAL EXAMINATION: ICD-10-CM

## 2023-11-24 DIAGNOSIS — R07.89 OTHER CHEST PAIN: ICD-10-CM

## 2023-11-24 PROCEDURE — 75571 CT HRT W/O DYE W/CA TEST: CPT | Mod: 26

## 2023-11-24 PROCEDURE — 76830 TRANSVAGINAL US NON-OB: CPT

## 2023-11-24 PROCEDURE — 76856 US EXAM PELVIC COMPLETE: CPT | Mod: 26

## 2023-11-24 PROCEDURE — 75571 CT HRT W/O DYE W/CA TEST: CPT

## 2023-11-24 PROCEDURE — 76856 US EXAM PELVIC COMPLETE: CPT

## 2023-11-24 PROCEDURE — 76830 TRANSVAGINAL US NON-OB: CPT | Mod: 26

## 2023-11-25 DIAGNOSIS — R07.89 OTHER CHEST PAIN: ICD-10-CM

## 2024-01-05 ENCOUNTER — LABORATORY RESULT (OUTPATIENT)
Age: 24
End: 2024-01-05

## 2024-01-22 DIAGNOSIS — E66.9 OBESITY, UNSPECIFIED: ICD-10-CM

## 2024-01-22 DIAGNOSIS — G47.30 SLEEP APNEA, UNSPECIFIED: ICD-10-CM

## 2024-01-22 DIAGNOSIS — E66.9 SLEEP APNEA, UNSPECIFIED: ICD-10-CM

## 2024-01-26 ENCOUNTER — TRANSCRIPTION ENCOUNTER (OUTPATIENT)
Age: 24
End: 2024-01-26

## 2024-03-08 RX ORDER — TIRZEPATIDE 7.5 MG/.5ML
7.5 INJECTION, SOLUTION SUBCUTANEOUS WEEKLY
Qty: 4 | Refills: 1 | Status: ACTIVE | COMMUNITY
Start: 2024-01-22 | End: 1900-01-01

## 2024-03-11 ENCOUNTER — LABORATORY RESULT (OUTPATIENT)
Age: 24
End: 2024-03-11

## 2024-03-13 ENCOUNTER — APPOINTMENT (OUTPATIENT)
Dept: ENDOCRINOLOGY | Facility: CLINIC | Age: 24
End: 2024-03-13
Payer: COMMERCIAL

## 2024-03-13 VITALS
BODY MASS INDEX: 33.68 KG/M2 | OXYGEN SATURATION: 98 % | HEIGHT: 62 IN | DIASTOLIC BLOOD PRESSURE: 70 MMHG | SYSTOLIC BLOOD PRESSURE: 120 MMHG | WEIGHT: 183 LBS

## 2024-03-13 DIAGNOSIS — E66.9 OBESITY, UNSPECIFIED: ICD-10-CM

## 2024-03-13 DIAGNOSIS — E03.9 HYPOTHYROIDISM, UNSPECIFIED: ICD-10-CM

## 2024-03-13 PROCEDURE — 99204 OFFICE O/P NEW MOD 45 MIN: CPT

## 2024-03-13 RX ORDER — ICOSAPENT ETHYL 1 G/1
1 CAPSULE ORAL
Qty: 360 | Refills: 1 | Status: ACTIVE | COMMUNITY
Start: 2022-08-10 | End: 1900-01-01

## 2024-03-13 NOTE — ASSESSMENT
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 , weight gain BMI 33 and DL previously followed by endo at [place]  # hypothyroidism : - in 2021 -2022 was found to have high TSH with normal Ft4 , had symptoms of hypothyroidism and was given Lt4 25 mcg daily - did not notice any change while on or off meds, she stopped taking it 3 weeks ago as she ran out - off levothyroxine for 3 weeks now TSH 3.06 - previously tpo and tg antibodies negative - will stay off meds, redo labs before next visit and recheck antibodies and reassess need for Lt4     # weight gain , BMI 33 - started recently on Zepbound 2.5 mg and dose increased to 5 mg Q week NOW Week 3 , overall tolerating it well and lost around 9 lbs - no PCOS diagnosis periods regular except recent had delay , no pregnancy plans now - no major SE except for some reflux - after 4 th dose of MOunjaro 5 mg Q week she will up to 7.5 mg Q week and stay on this dose until reevaluated   # DL / early CAD in family (mom and dad ) had work up with calcium score 0 LDL ok no statin - is on vascepa 2 g BID, continue

## 2024-03-13 NOTE — HISTORY OF PRESENT ILLNESS
Last Written Prescription Date:  9/15/22  Last Fill Quantity: 90,   Last office visit: 9/6/2022   Future Office Visit:        Called and spoke with patient. She is taking medication 4 times daily.  Please consider changing Disp amount.          
  Medication Question or Refill        What medication are you calling about (include dose and sig)?: hydroxyzine    Controlled Substance Agreement on file:   CSA -- Patient Level:    CSA: None found at the patient level.       Who prescribed the medication?: Dr. Terry    Do you need a refill? Yes: pt has 3 days left    When did you use the medication last? 9/29/2022    Patient offered an appointment? No    Do you have any questions or concerns?  No    Preferred Pharmacy:     [id number] [address] [facility] [address]  [address] [address]  Phone: [phone] Fax: [phone]        Could we send this information to you in Vputi or would you prefer to receive a phone call?:   Patient would prefer a phone call   Okay to leave a detailed message?: Yes at Cell number on file:    Telephone Information:   Mobile [phone]       
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 , weight gain BMI 33 and DL  previously followed by endo at [place]  # hypothyroidism : - in 2021 -2022 was found to have high TSH with normal Ft4 , had symptoms of hypothyroidism and was given Lt4 25 mcg daily  - did not notice any change while on or off meds, she stopped taking it 3 weeks ago as she ran out  - off levothyroxine for 3 weeks now   TSH 3.06 - previously tpo and tg antibodies negative     # weight gain , BMI 33  - started recently on Zepbound 2.5 mg and dose increased to 5 mg Q week NOW Week 3 , overall tolerating it well  and lost around 9 lbs  - no PCOS diagnosis periods regular except recent had delay , no pregnancy plans now  - no major SE except for some reflux   # DL / early CAD in family (mom and dad ) had work up with calcium score 0 LDL ok no statin  - is on vascepa 2 g BID

## 2024-03-13 NOTE — REASON FOR VISIT
[Initial Evaluation] : an initial evaluation [Hypothyroidism] : hypothyroidism [Weight Management/Obesity] : weight management/obesity [Parent] : parent

## 2024-03-13 NOTE — PHYSICAL EXAM
[Alert] : alert [Healthy Appearance] : healthy appearance [No Acute Distress] : no acute distress [No Proptosis] : no proptosis [No Lid Lag] : no lid lag [Thyroid Not Enlarged] : the thyroid was not enlarged [No Respiratory Distress] : no respiratory distress [No Accessory Muscle Use] : no accessory muscle use [Clear to Auscultation] : lungs were clear to auscultation bilaterally [Normal S1, S2] : normal S1 and S2 [No Murmurs] : no murmurs [Regular Rhythm] : with a regular rhythm [No Edema] : no peripheral edema [Soft] : abdomen soft [No Stigmata of Cushings Syndrome] : no stigmata of Cushings Syndrome [Abdominal Striae] : no abdominal striae [Acanthosis Nigricans] : acanthosis nigricans present [Acne] : acne present [Hirsutism] : hirsutism present [No Tremors] : no tremors [de-identified] : BMi 33  [Oriented x3] : oriented to person, place, and time

## 2024-03-13 NOTE — DATA REVIEWED
[FreeTextEntry1] : 8/2022 tpo antibodies negative tg antibodies negative 27 oh progesterone 42  1/2024: TSH 2.42  3/11/24: TSH 3.06  TT4 7.9 LDL 89 Tg 151 A1c 5.4% lipase 61 25 vit d 33

## 2024-03-27 ENCOUNTER — TRANSCRIPTION ENCOUNTER (OUTPATIENT)
Age: 24
End: 2024-03-27

## 2024-04-24 RX ORDER — TIRZEPATIDE 7.5 MG/.5ML
7.5 INJECTION, SOLUTION SUBCUTANEOUS
Qty: 4 | Refills: 1 | Status: ACTIVE | COMMUNITY
Start: 2024-04-24 | End: 1900-01-01

## 2024-05-13 ENCOUNTER — RX RENEWAL (OUTPATIENT)
Age: 24
End: 2024-05-13

## 2024-05-13 RX ORDER — FAMOTIDINE 20 MG/1
20 TABLET, FILM COATED ORAL
Qty: 90 | Refills: 0 | Status: ACTIVE | COMMUNITY
Start: 2024-03-13 | End: 1900-01-01

## 2024-05-28 ENCOUNTER — APPOINTMENT (OUTPATIENT)
Dept: NEUROPSYCHOLOGY | Facility: CLINIC | Age: 24
End: 2024-05-28

## 2024-05-29 ENCOUNTER — TRANSCRIPTION ENCOUNTER (OUTPATIENT)
Age: 24
End: 2024-05-29

## 2024-06-03 ENCOUNTER — OUTPATIENT (OUTPATIENT)
Dept: OUTPATIENT SERVICES | Facility: HOSPITAL | Age: 24
LOS: 1 days | End: 2024-06-03
Payer: COMMERCIAL

## 2024-06-03 ENCOUNTER — APPOINTMENT (OUTPATIENT)
Dept: NEUROPSYCHOLOGY | Facility: CLINIC | Age: 24
End: 2024-06-03

## 2024-06-03 DIAGNOSIS — F43.23 ADJUSTMENT DISORDER WITH MIXED ANXIETY AND DEPRESSED MOOD: ICD-10-CM

## 2024-06-03 PROCEDURE — 90837 PSYTX W PT 60 MINUTES: CPT | Mod: 95

## 2024-06-04 DIAGNOSIS — F43.23 ADJUSTMENT DISORDER WITH MIXED ANXIETY AND DEPRESSED MOOD: ICD-10-CM

## 2024-06-06 ENCOUNTER — LABORATORY RESULT (OUTPATIENT)
Age: 24
End: 2024-06-06

## 2024-06-17 RX ORDER — CIPROFLOXACIN AND DEXAMETHASONE 3; 1 MG/ML; MG/ML
0.3-0.1 SUSPENSION/ DROPS AURICULAR (OTIC) TWICE DAILY
Qty: 1 | Refills: 1 | Status: ACTIVE | COMMUNITY
Start: 2023-06-19 | End: 1900-01-01

## 2024-06-24 ENCOUNTER — APPOINTMENT (OUTPATIENT)
Dept: NEUROPSYCHOLOGY | Facility: CLINIC | Age: 24
End: 2024-06-24

## 2024-06-24 ENCOUNTER — OUTPATIENT (OUTPATIENT)
Dept: OUTPATIENT SERVICES | Facility: HOSPITAL | Age: 24
LOS: 1 days | End: 2024-06-24

## 2024-06-24 DIAGNOSIS — F43.23 ADJUSTMENT DISORDER WITH MIXED ANXIETY AND DEPRESSED MOOD: ICD-10-CM

## 2024-07-01 ENCOUNTER — APPOINTMENT (OUTPATIENT)
Dept: NEUROPSYCHOLOGY | Facility: CLINIC | Age: 24
End: 2024-07-01

## 2024-07-05 ENCOUNTER — TRANSCRIPTION ENCOUNTER (OUTPATIENT)
Age: 24
End: 2024-07-05

## 2024-07-10 ENCOUNTER — OUTPATIENT (OUTPATIENT)
Dept: OUTPATIENT SERVICES | Facility: HOSPITAL | Age: 24
LOS: 1 days | Discharge: HOME | End: 2024-07-10
Payer: COMMERCIAL

## 2024-07-10 ENCOUNTER — APPOINTMENT (OUTPATIENT)
Dept: NEUROPSYCHOLOGY | Facility: CLINIC | Age: 24
End: 2024-07-10

## 2024-07-10 DIAGNOSIS — F33.1 MAJOR DEPRESSIVE DISORDER, RECURRENT, MODERATE: ICD-10-CM

## 2024-07-10 PROCEDURE — 90837 PSYTX W PT 60 MINUTES: CPT | Mod: 95

## 2024-07-11 DIAGNOSIS — F33.1 MAJOR DEPRESSIVE DISORDER, RECURRENT, MODERATE: ICD-10-CM

## 2024-07-17 ENCOUNTER — APPOINTMENT (OUTPATIENT)
Dept: NEUROPSYCHOLOGY | Facility: CLINIC | Age: 24
End: 2024-07-17

## 2024-07-17 ENCOUNTER — OUTPATIENT (OUTPATIENT)
Dept: OUTPATIENT SERVICES | Facility: HOSPITAL | Age: 24
LOS: 1 days | Discharge: HOME | End: 2024-07-17

## 2024-07-17 DIAGNOSIS — F33.1 MAJOR DEPRESSIVE DISORDER, RECURRENT, MODERATE: ICD-10-CM

## 2024-07-22 ENCOUNTER — APPOINTMENT (OUTPATIENT)
Dept: NEUROPSYCHOLOGY | Facility: CLINIC | Age: 24
End: 2024-07-22

## 2024-07-23 ENCOUNTER — APPOINTMENT (OUTPATIENT)
Dept: ENDOCRINOLOGY | Facility: CLINIC | Age: 24
End: 2024-07-23
Payer: COMMERCIAL

## 2024-07-23 DIAGNOSIS — E66.9 OBESITY, UNSPECIFIED: ICD-10-CM

## 2024-07-23 DIAGNOSIS — E03.9 HYPOTHYROIDISM, UNSPECIFIED: ICD-10-CM

## 2024-07-23 PROCEDURE — 99441: CPT

## 2024-07-23 RX ORDER — TIRZEPATIDE 10 MG/.5ML
10 INJECTION, SOLUTION SUBCUTANEOUS
Qty: 3 | Refills: 1 | Status: ACTIVE | COMMUNITY
Start: 2024-07-23 | End: 1900-01-01

## 2024-07-23 NOTE — REASON FOR VISIT
[Hypothyroidism] : hypothyroidism [Weight Management/Obesity] : weight management/obesity [Follow - Up] : a follow-up visit [Home] : at home, [unfilled] , at the time of the visit. [Medical Office: ([facility])___] : at the medical office located in  [Verbal consent obtained from patient] : the patient, [unfilled]

## 2024-07-23 NOTE — DATA REVIEWED
[FreeTextEntry1] : 8/2022 tpo antibodies negative tg antibodies negative 27 oh progesterone 42  1/2024: TSH 2.42  3/11/24: TSH 3.06  TT4 7.9 LDL 89 Tg 151 A1c 5.4% lipase 61 25 vit d 33  6/2024: TOtal test 18.6 ferritin 75 b12 1188 folate 7.2 TSH 1.37  Ft4 1.3 lipase 57  amylase 58    Tg 94 cmp normal A1c 5.1%

## 2024-07-23 NOTE — PAST MEDICAL HISTORY
[Menstruating] : The patient is menstruating [Definite ___ (Date)] : the last menstrual period was [unfilled] Followed protocol [Regular Cycle Intervals] : have been regular

## 2024-07-23 NOTE — ASSESSMENT
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 hypothyroidism , weight gain BMI 33 and DL previously followed by endo at [place]   # hypothyroidism : - in 2021 -2022 was found to have high TSH with normal Ft4 , had symptoms of hypothyroidism and was given Lt4 25 mcg daily - 3/2024 did not notice any change while on or off meds, she stopped taking it 3 weeks ago as she ran out TSH 3.06 - previously tpo and tg antibodies negative - 6/2024: TSh is normal now off med stay off and monitor    # weight gain , BMI 33 - started on Zepbound 2.5 mg and dose increased to 5 mg Q week , overall tolerating it well and lost around 9 lbs - no PCOS diagnosis periods regular except recent had delay , no pregnancy plans now - no major SE except for some reflux -7/2024 lost 15 lbs , Zepbound at 7.5 mg Q week no major side effects Gerd managed with pepcid will increase dose to 10 mg Q week    # DL / early CAD in family (mom and dad ) had work up with calcium score 0 LDL ok no statin - is on vascepa 2 g BID, continue

## 2024-07-23 NOTE — HISTORY OF PRESENT ILLNESS
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 hypothyroidism , weight gain BMI 33 and DL  previously followed by endo at [place]  # hypothyroidism : - in 2021 -2022 was found to have high TSH with normal Ft4 , had symptoms of hypothyroidism and was given Lt4 25 mcg daily  - 3/2024 did not notice any change while on or off meds, she stopped taking it 3 weeks ago as she ran out    TSH 3.06 - previously tpo and tg antibodies negative  - 6/2024: TSh is normal now off med    # weight gain , BMI 33  - started  on Zepbound 2.5 mg and dose increased to 5 mg Q week , overall tolerating it well  and lost around 9 lbs  - no PCOS diagnosis periods regular except recent had delay , no pregnancy plans now  - no major SE except for some reflux  -7/2024  lost 15 lbs , Zepbound at 7.5 mg Q week no major side effects Gerd managed with pepcid   # DL / early CAD in family (mom and dad ) had work up with calcium score 0 LDL ok no statin  - is on vascepa 2 g BID

## 2024-07-29 ENCOUNTER — APPOINTMENT (OUTPATIENT)
Dept: NEUROPSYCHOLOGY | Facility: CLINIC | Age: 24
End: 2024-07-29

## 2024-07-29 ENCOUNTER — OUTPATIENT (OUTPATIENT)
Dept: OUTPATIENT SERVICES | Facility: HOSPITAL | Age: 24
LOS: 1 days | Discharge: HOME | End: 2024-07-29

## 2024-07-29 DIAGNOSIS — F33.1 MAJOR DEPRESSIVE DISORDER, RECURRENT, MODERATE: ICD-10-CM

## 2024-08-02 ENCOUNTER — NON-APPOINTMENT (OUTPATIENT)
Age: 24
End: 2024-08-02

## 2024-08-05 ENCOUNTER — OUTPATIENT (OUTPATIENT)
Dept: OUTPATIENT SERVICES | Facility: HOSPITAL | Age: 24
LOS: 1 days | End: 2024-08-05
Payer: COMMERCIAL

## 2024-08-05 ENCOUNTER — APPOINTMENT (OUTPATIENT)
Dept: NEUROPSYCHOLOGY | Facility: CLINIC | Age: 24
End: 2024-08-05

## 2024-08-05 DIAGNOSIS — F33.1 MAJOR DEPRESSIVE DISORDER, RECURRENT, MODERATE: ICD-10-CM

## 2024-08-05 PROCEDURE — 90837 PSYTX W PT 60 MINUTES: CPT | Mod: 95

## 2024-08-06 DIAGNOSIS — F33.1 MAJOR DEPRESSIVE DISORDER, RECURRENT, MODERATE: ICD-10-CM

## 2024-08-14 ENCOUNTER — OUTPATIENT (OUTPATIENT)
Dept: OUTPATIENT SERVICES | Facility: HOSPITAL | Age: 24
LOS: 1 days | End: 2024-08-14

## 2024-08-14 ENCOUNTER — APPOINTMENT (OUTPATIENT)
Dept: NEUROPSYCHOLOGY | Facility: CLINIC | Age: 24
End: 2024-08-14

## 2024-08-14 DIAGNOSIS — F33.1 MAJOR DEPRESSIVE DISORDER, RECURRENT, MODERATE: ICD-10-CM

## 2024-08-21 ENCOUNTER — APPOINTMENT (OUTPATIENT)
Dept: PSYCHIATRY | Facility: CLINIC | Age: 24
End: 2024-08-21

## 2024-08-21 ENCOUNTER — OUTPATIENT (OUTPATIENT)
Dept: OUTPATIENT SERVICES | Facility: HOSPITAL | Age: 24
LOS: 1 days | End: 2024-08-21

## 2024-08-21 ENCOUNTER — APPOINTMENT (OUTPATIENT)
Dept: NEUROPSYCHOLOGY | Facility: CLINIC | Age: 24
End: 2024-08-21

## 2024-08-21 DIAGNOSIS — F33.1 MAJOR DEPRESSIVE DISORDER, RECURRENT, MODERATE: ICD-10-CM

## 2024-08-21 NOTE — FAMILY HISTORY
[FreeTextEntry1] :  Family composition: mother, father, brother (19)   Family history and background: Born in [place]   Family relationship: "I have a difficult relationship with my mother bc she is very anxious, so she wants my location and wants to call me every day" "she is very critical of me so not a great relationship with her" "My dad is nice, but he is not very involved in my parenting because my mom had really strong opinions about it. My brother is very independent so he does not always reach out I mainly reach out, but I do keep in contact with him" Brother dx with ASD   Pertinent Family Medical, MH and Substance Use History including Adult Child of Alcoholic and child of substance abuse status, history of cancer and heart disease:  Mother heart disease, diabetes, "very anxious/ depression but not treated" Paternal grandmother heart disease Maternal grandmother () diabetes, heart failure Maternal grandfather colon CA  Father hypothyroidism Maternal aunt (Chron's disease)

## 2024-08-21 NOTE — HISTORY OF PRESENT ILLNESS
[Not Applicable] : Not applicable [FreeTextEntry1] : Date: 8/21/2024 Session began at: 1:00PM Session ended at: 2:00PM  This writer completed intake interview with patient via telehealth. Patient presents via Solo audio/video in her home. Pt not eligible for [facility] referral. All consents have been signed. The patient is a 24-year-old female referred by Dr. Carmichael. The patient is domiciled alone in an apartment in [place], [place]. 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 PPH patient currently receives psychotherapy services with Dr. Carmichael (reportedly began treatment in 2021) and was referred to [facility] for ADHD/ anxiety dx. Patient reports that she wishes to be on an adequate medication regimen. Patient reports that she "always struggled with ADHD sx but sx worsened when starting college" Patient states, during COVID when classes were virtual, I" really noticed ADHD sx as it was difficult to do schoolwork" ADHD sx described as inattentiveness and difficulty to complete tasks. Patient states, "My room is a mess, and I am tired of that, I would like to manage more than one domain of my life" Patient denies current medication use, however reports hx of taking Adderall XR, Methylphenidate, and dexmethylphenidate. Patient reports that "Adderall XR helped with focus and all issues with completing tasks was not an issue, however as soon as it wore off, I got terrible headaches and could not do much" Patient reports, "most medications that I have tried would last only 5-6hours" Patient reports "trying medication through the student health service (unsure of medication name) and it had given me chest pains" Patient reportedly took said medication for x1 week. Patient expressed concerns regarding potential heart issues contributed by medications as heart disease runs in her family; patient reportedly saw cardiologist and no heart issues were identified. Patient identifies anxiety sx described as excessive worrying, overthinking, and an increase in heart rate. Patient denies suicidal ideations (past/current), denies homicidal ideations (past/current), denies hx of eating dx, denies hx of abuse/ trauma, denies AVH. Patient would like to continue therapy with Dr. Carmichael and attend [facility] for medication management. Patient's psychiatric evaluation is scheduled 9/6/2024 @ 10AM with Dr. Alvarado.  [facility] sleep apnea [FreeTextEntry2] : PPH patient currently receives psychotherapy services with Dr. Carmichael (reportedly began treatment in 2021) and was referred to [facility] for ADHD/ anxiety dx. Patient reports that she wishes to be on an adequate medication regimen. Patient reports that she "always struggled with ADHD sx but sx worsened when starting college" Patient states, during COVID when classes were virtual, I" really noticed ADHD sx as it was difficult to do schoolwork" ADHD sx described as inattentiveness and difficulty to complete tasks. Patient states, "My room is a mess, and I am tired of that, I would like to manage more than one domain of my life" Patient denies current medication use, however reports hx of taking Adderall XR, Methylphenidate, and dexmethylphenidate. Patient reports that "Adderall XR helped with focus and all issues with completing tasks was not an issue, however as soon as it wore off, I got terrible headaches and could not do much" Patient reports, "most medications that I have tried would last only 5-6hours" Patient reports "trying medication through the [employer] health service (unsure of medication name) and it had given me chest pains" Patient reportedly took said medication for x1 week. Patient expressed concerns regarding potential heart issues contributed by medications as heart disease runs in her family; patient reportedly saw cardiologist and no heart issues were identified. Patient identifies anxiety sx described as excessive worrying, overthinking, and an increase in heart rate. Patient denies suicidal ideations (past/current), denies homicidal ideations (past/current), denies hx of eating dx, denies hx of abuse/ trauma, denies AVH. Patient would like to continue therapy with Dr. Carmichael and attend [facility] for medication management.  [FreeTextEntry3] : Current: none Hx: Adderall XR, "Methylphenidate, dexmethylphenidate"

## 2024-08-21 NOTE — SOCIAL HISTORY
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 "Culturally I engage in Restorationism practices, holidays and festivals"   Do you consider yourself spiritual or Orthodox?  "Neither"   Is there something you believe in that gives meaning to your life? "I want to do good in my life"   Is it important in your life? "Yes"   What influence does it have on how you take care of yourself? "I think it has more to do with my career in some ways I am more motivated to take care of myself so that I can have more energy for studying and volunteering"    How have your beliefs influenced your behavior during this illness? "Having the goal of wanting to become a doctor and helping people helps motivate me to study more"    What role do your beliefs play in regaining your health? "Providing additional motivation to push through whatever feeling or difficulty I may have"   Are you part of a spiritual or Orthodox community? "Restorationism community"   Is this of support to you and how? "I do not go to the temple often but holding cultural practices are important to me and I find them to be joyful"   Is there a person or group of people you really love or who are really important to you? "My friends and family"   We have been discussing your belief and supports. What else gives you internal support? "No"   What are your sources of hope, strength, comfort and peace? "People who are my role models, comfort from my closest friends, my friends are also helpful for strength and peace"   What do you hold on to during difficult times? "I problem solve so I can think about it logically"

## 2024-08-21 NOTE — PSYCHOSOCIAL ASSESSMENT
[All substances negative except as specified below] : All substances negative except as specified below [_____] : First Use: [unfilled] [Yes (add details)] : Patient attended school, home tutoring, or received education instruction at anytime in the past three months? Yes [Other: _____] : [unfilled] [Student] : student [Dependent] : dependent [None] : none [Private residence (home, apartment, rooming house, hotel, motel, supported housing, supported Single Room Occupancy (SRO),] : Private residence (home, apartment, rooming house, hotel, motel, supported housing, supported Single Room Occupancy (SRO), permanent housing programs, transient housing programs, and shelter plus care housing) [Client lives alone] : client lives alone [N/A] : n/a [Friend] : friend [Good] : good [Frequent Contact] : frequent contact [No] : Patient has personal representation (legal guardian, representative payee, conservatorship)? No [FreeTextEntry1] : Full-time student [FreeTextEntry7] : Yahaira [FreeTextEntry8] : Ami

## 2024-08-21 NOTE — HISTORY OF PRESENT ILLNESS
[Not Applicable] : Not applicable [FreeTextEntry1] : Date: 8/21/2024 Session began at: 1:00PM Session ended at: 2:00PM  This writer completed intake interview with patient via telehealth. Patient presents via Solo audio/video in her home. Pt not eligible for [facility] referral. All consents have been signed. The patient is a 24-year-old female referred by Dr. Carmichael. The patient is domiciled alone in an apartment in [place], [place]. 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 PPH patient currently receives psychotherapy services with Dr. Carmichael (reportedly began treatment in 2021) and was referred to [facility] for ADHD/ anxiety dx. Patient reports that she wishes to be on an adequate medication regimen. Patient reports that she "always struggled with ADHD sx but sx worsened when starting college" Patient states, during COVID when classes were virtual, I" really noticed ADHD sx as it was difficult to do schoolwork" ADHD sx described as inattentiveness and difficulty to complete tasks. Patient states, "My room is a mess, and I am tired of that, I would like to manage more than one domain of my life" Patient denies current medication use, however reports hx of taking Adderall XR, Methylphenidate, and dexmethylphenidate. Patient reports that "Adderall XR helped with focus and all issues with completing tasks was not an issue, however as soon as it wore off, I got terrible headaches and could not do much" Patient reports, "most medications that I have tried would last only 5-6hours" Patient reports "trying medication through the student health service (unsure of medication name) and it had given me chest pains" Patient reportedly took said medication for x1 week. Patient expressed concerns regarding potential heart issues contributed by medications as heart disease runs in her family; patient reportedly saw cardiologist and no heart issues were identified. Patient identifies anxiety sx described as excessive worrying, overthinking, and an increase in heart rate. Patient denies suicidal ideations (past/current), denies homicidal ideations (past/current), denies hx of eating dx, denies hx of abuse/ trauma, denies AVH. Patient would like to continue therapy with Dr. Carmichael and attend [facility] for medication management. Patient's psychiatric evaluation is scheduled 9/6/2024 @ 10AM with Dr. Alvarado.  [facility] sleep apnea [FreeTextEntry2] : PPH patient currently receives psychotherapy services with Dr. Carmichael (reportedly began treatment in 2021) and was referred to [facility]PD for ADHD/ anxiety dx. Patient reports that she wishes to be on an adequate medication regimen. Patient reports that she "always struggled with ADHD sx but sx worsened when starting college" Patient states, during COVID when classes were virtual, I" really noticed ADHD sx as it was difficult to do schoolwork" ADHD sx described as inattentiveness and difficulty to complete tasks. Patient states, "My room is a mess, and I am tired of that, I would like to manage more than one domain of my life" Patient denies current medication use, however reports hx of taking Adderall XR, Methylphenidate, and dexmethylphenidate. Patient reports that "Adderall XR helped with focus and all issues with completing tasks was not an issue, however as soon as it wore off, I got terrible headaches and could not do much" Patient reports, "most medications that I have tried would last only 5-6hours" Patient reports "trying medication through the [facility] health service (unsure of medication name) and it had given me chest pains" Patient reportedly took said medication for x1 week. Patient expressed concerns regarding potential heart issues contributed by medications as heart disease runs in her family; patient reportedly saw cardiologist and no heart issues were identified. Patient identifies anxiety sx described as excessive worrying, overthinking, and an increase in heart rate. Patient denies suicidal ideations (past/current), denies homicidal ideations (past/current), denies hx of eating dx, denies hx of abuse/ trauma, denies AVH. Patient would like to continue therapy with Dr. Carmichael and attend [facility] for medication management.  [FreeTextEntry3] : Current: none Hx: Adderall XR, "Methylphenidate, dexmethylphenidate"

## 2024-08-21 NOTE — DISCUSSION/SUMMARY
[Low acute suicide risk] : Low acute suicide risk [No] : No [Not clinically indicated] : Safety Plan completed/updated (for individuals at risk): Not clinically indicated [FreeTextEntry1] :  Life goals: "I've noticed that during the week I am very functional then during the weekends I just don't want to do anything I am very tired, I do not like that. For ADHD dx current goal with medication is to not get any side effects w/ heart (i.e. racing heart, chest pains. I would like to try a medication that would last longer" "Would like to manage daily life tasks"   Strengths: "I work hard, I am kind and passionate, empathetic and funny"   Barriers: medication side effects   Past successes:  " when i was on medication i did well in physics with significantly less effort than other classes I understood the material and it was not hard vs in high school it was hard when i was not hard I would love for that to happen again so I do not have to spend as much time studying"   Recommendation: [X] Medication Only, [ ] Individual therapy only, [ ] Medication and Individual therapy, [ ] Group therapy  Date: 8/21/2024 Session began at: 1:00PM Session ended at: 2:00PM  This writer completed intake interview with patient via telehealth. Patient presents via Solo audio/video in her home. Pt not eligible for [facility] referral. All consents have been signed. The patient is a 24-year-old female referred by Dr. Carmichael. The patient is domiciled alone in an apartment in [place], [place]. 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 PPH patient currently receives psychotherapy services with Dr. Carmichael (reportedly began treatment in 2021) and was referred to [facility]PD for ADHD/ anxiety dx. Patient reports that she wishes to be on an adequate medication regimen. Patient reports that she "always struggled with ADHD sx but sx worsened when starting college" Patient states, during COVID when classes were virtual, I" really noticed ADHD sx as it was difficult to do schoolwork" ADHD sx described as inattentiveness and difficulty to complete tasks. Patient states, "My room is a mess, and I am tired of that, I would like to manage more than one domain of my life" Patient denies current medication use, however reports hx of taking Adderall XR, Methylphenidate, and dexmethylphenidate. Patient reports that "Adderall XR helped with focus and all issues with completing tasks was not an issue, however as soon as it wore off, I got terrible headaches and could not do much" Patient reports, "most medications that I have tried would last only 5-6hours" Patient reports "trying medication through the [facility] health service (unsure of medication name) and it had given me chest pains" Patient reportedly took said medication for x1 week. Patient expressed concerns regarding potential heart issues contributed by medications as heart disease runs in her family; patient reportedly saw cardiologist and no heart issues were identified. Patient identifies anxiety sx described as excessive worrying, overthinking, and an increase in heart rate. Patient denies suicidal ideations (past/current), denies homicidal ideations (past/current), denies hx of eating dx, denies hx of abuse/ trauma, denies AVH. Patient would like to continue therapy with Dr. Carmichael and attend [facility] for medication management. Patient's psychiatric evaluation is scheduled 9/6/2024 @ 10AM with Dr. Alvarado.  [facility] sleep apnea

## 2024-08-21 NOTE — RISK ASSESSMENT
[Clinical Interview] : Clinical Interview [ADHD] : ADHD [Depressed mood/Anhedonia] : depressed mood/anhedonia [Severe anxiety, agitation or panic] : severe anxiety, agitation or panic [Identifies reasons for living] : identifies reasons for living [Supportive social network of family or friends] : supportive social network of family or friends [Engaged in work or school] : engaged in work or school [None in the patient's lifetime] : None in the patient's lifetime [None Known] : none known [Residential stability] : residential stability [Sobriety] : sobriety [No] : no [Triggering events leading to humiliation, shame, and/or despair] : triggering events leading to humiliation, shame, and/or despair (e.g. loss of relationship, financial or health status) (real or anticipated) [No known risk factors] : No known risk factors [Relationship stability] : relationship stability [Insight into violence risk and need for management/treatment] : insight into violence risk and need for management/treatment [Engagement in treatment] : engagement in treatment [Good treatment response/compliance] : good treatment response/compliance [Irritability] : irritability

## 2024-08-21 NOTE — FAMILY HISTORY
[FreeTextEntry1] :  Family composition: mother, father, brother (19)   Family history and background: Born in [place]   Family relationship: "I have a difficult relationship with my mother bc she is very anxious, so she wants my location and wants to call me every day" "she is very critical of me so not a great relationship with her" "My dad is nice, but he is not very involved in my parenting because my mom had really strong opinions about it. My brother is very independent so he does not always reach out I mainly reach out, but I do keep in contact with him" Brother dx with ASD   Pertinent Family Medical, MH and Substance Use History including Adult Child of Alcoholic and child of substance abuse status, history of cancer and heart disease:  Mother heart disease, diabetes, "very anxious/ depression but not treated" Paternal grandmother heart disease Maternal grandmother () diabetes, heart failure Maternal grandfather colon CA  Father hypothyroidism Maternal aunt (Chron's disease)

## 2024-08-21 NOTE — REASON FOR VISIT
[Medical Office: ([facility])___] : The provider was located at the medical office in [unfilled]. [Patient preference] : as per patient preference [Telehealth (audio & video) - Individual/Group] : This visit was provided via telehealth using real-time 2-way audio visual technology. [Home] : The patient, [unfilled], was located at home, [unfilled], at the time of the visit. [Verbal consent obtained from patient/other participant(s)] : Verbal consent for telehealth/telephonic services obtained from patient/other participant(s) [Number can be texted] : number can be texted [OK  to leave message] : OK  to leave message [Specialty Physician] : Specialty Physician [[facility] Provider/Facility] : [facility] Provider/Facility [Patient] : Patient [FreeTextEntry4] : 1:00PM [FreeTextEntry3] : [email or web address] [FreeTextEntry5] : English [FreeTextEntry6] : Jo [FreeTextEntry7] : she/her/hers [FreeTextEntry2] : ADHD/ anxiety  [FreeTextEntry1] : ADHD/ anxiety

## 2024-08-21 NOTE — PHYSICAL EXAM
[Cooperative] : cooperative [Depressed] : depressed [Flat] : flat [Clear] : clear [Linear/Goal Directed] : linear/goal directed [Average] : average [WNL] : within normal limits [Individual reports tobacco use during the last 30 days?] : Individual reports tobacco use during the last 30 days? No [Individual reports use of the following tobacco products during the last 30 days?] : Individual reports use of the following tobacco products during the last 30 days? No [Individual reports current use of tobacco cessation medication or nicotine replacement therapy?] : Individual reports current use of tobacco cessation medication or nicotine replacement therapy? No [Was tobacco cessation medication and/or nicotine replacement therapy recommended?] : Was tobacco cessation medication and/or nicotine replacement therapy recommended? No [Does individual accept referral to MD for cessation medication or NRT?] : Does individual accept referral to MD for cessation medication or NRT? No

## 2024-08-21 NOTE — REASON FOR VISIT
[Medical Office: ([facility])___] : The provider was located at the medical office in [unfilled]. [Patient preference] : as per patient preference [Telehealth (audio & video) - Individual/Group] : This visit was provided via telehealth using real-time 2-way audio visual technology. [Home] : The patient, [unfilled], was located at home, [unfilled], at the time of the visit. [Verbal consent obtained from patient/other participant(s)] : Verbal consent for telehealth/telephonic services obtained from patient/other participant(s) [Number can be texted] : number can be texted [OK  to leave message] : OK  to leave message [Specialty Physician] : Specialty Physician [[facility] Provider/Facility] : [facility] Provider/Facility [Patient] : Patient [FreeTextEntry4] : 1:00PM [FreeTextEntry3] : [email or web address] [FreeTextEntry5] : English [FreeTextEntry6] : Jo [FreeTextEntry7] : she/her/hers [FreeTextEntry2] : ADHD/ anxiety  [FreeTextEntry1] : ADHD/ anxiety

## 2024-08-21 NOTE — SOCIAL HISTORY
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 "Culturally I engage in Episcopalian practices, holidays and festivals"   Do you consider yourself spiritual or Pentecostal?  "Neither"   Is there something you believe in that gives meaning to your life? "I want to do good in my life"   Is it important in your life? "Yes"   What influence does it have on how you take care of yourself? "I think it has more to do with my career in some ways I am more motivated to take care of myself so that I can have more energy for studying and volunteering"    How have your beliefs influenced your behavior during this illness? "Having the goal of wanting to become a doctor and helping people helps motivate me to study more"    What role do your beliefs play in regaining your health? "Providing additional motivation to push through whatever feeling or difficulty I may have"   Are you part of a spiritual or Pentecostal community? "Episcopalian community"   Is this of support to you and how? "I do not go to the temple often but holding cultural practices are important to me and I find them to be joyful"   Is there a person or group of people you really love or who are really important to you? "My friends and family"   We have been discussing your belief and supports. What else gives you internal support? "No"   What are your sources of hope, strength, comfort and peace? "People who are my role models, comfort from my closest friends, my friends are also helpful for strength and peace"   What do you hold on to during difficult times? "I problem solve so I can think about it logically"

## 2024-08-21 NOTE — DISCUSSION/SUMMARY
[Low acute suicide risk] : Low acute suicide risk [No] : No [Not clinically indicated] : Safety Plan completed/updated (for individuals at risk): Not clinically indicated [FreeTextEntry1] :  Life goals: "I've noticed that during the week I am very functional then during the weekends I just don't want to do anything I am very tired, I do not like that. For ADHD dx current goal with medication is to not get any side effects w/ heart (i.e. racing heart, chest pains. I would like to try a medication that would last longer" "Would like to manage daily life tasks"   Strengths: "I work hard, I am kind and passionate, empathetic and funny"   Barriers: medication side effects   Past successes:  " when i was on medication i did well in physics with significantly less effort than other classes I understood the material and it was not hard vs in high school it was hard when i was not hard I would love for that to happen again so I do not have to spend as much time studying"   Recommendation: [X] Medication Only, [ ] Individual therapy only, [ ] Medication and Individual therapy, [ ] Group therapy  Date: 8/21/2024 Session began at: 1:00PM Session ended at: 2:00PM  This writer completed intake interview with patient via telehealth. Patient presents via Solo audio/video in her home. Pt not eligible for [facility] referral. All consents have been signed. The patient is a 24-year-old female referred by Dr. Carmichael. The patient is domiciled alone in an apartment in [place], [place]. 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 PPH patient currently receives psychotherapy services with Dr. Carmichael (reportedly began treatment in 2021) and was referred to [facility]PD for ADHD/ anxiety dx. Patient reports that she wishes to be on an adequate medication regimen. Patient reports that she "always struggled with ADHD sx but sx worsened when starting college" Patient states, during COVID when classes were virtual, I" really noticed ADHD sx as it was difficult to do schoolwork" ADHD sx described as inattentiveness and difficulty to complete tasks. Patient states, "My room is a mess, and I am tired of that, I would like to manage more than one domain of my life" Patient denies current medication use, however reports hx of taking Adderall XR, Methylphenidate, and dexmethylphenidate. Patient reports that "Adderall XR helped with focus and all issues with completing tasks was not an issue, however as soon as it wore off, I got terrible headaches and could not do much" Patient reports, "most medications that I have tried would last only 5-6hours" Patient reports "trying medication through the [facility] health service (unsure of medication name) and it had given me chest pains" Patient reportedly took said medication for x1 week. Patient expressed concerns regarding potential heart issues contributed by medications as heart disease runs in her family; patient reportedly saw cardiologist and no heart issues were identified. Patient identifies anxiety sx described as excessive worrying, overthinking, and an increase in heart rate. Patient denies suicidal ideations (past/current), denies homicidal ideations (past/current), denies hx of eating dx, denies hx of abuse/ trauma, denies AVH. Patient would like to continue therapy with Dr. Carmichael and attend [facility] for medication management. Patient's psychiatric evaluation is scheduled 9/6/2024 @ 10AM with Dr. Alvarado.  [facility] sleep apnea

## 2024-08-21 NOTE — HEALTH RISK ASSESSMENT
[With Patient/Caregiver] : , with patient/caregiver [With Patient/Collateral] : , with patient/collateral [AdvancecareDate] : 8/21/2024 [] : 8/21/2024

## 2024-09-06 ENCOUNTER — APPOINTMENT (OUTPATIENT)
Dept: PSYCHIATRY | Facility: CLINIC | Age: 24
End: 2024-09-06
Payer: COMMERCIAL

## 2024-09-06 ENCOUNTER — OUTPATIENT (OUTPATIENT)
Dept: OUTPATIENT SERVICES | Facility: HOSPITAL | Age: 24
LOS: 1 days | End: 2024-09-06
Payer: COMMERCIAL

## 2024-09-06 DIAGNOSIS — F41.9 ANXIETY DISORDER, UNSPECIFIED: ICD-10-CM

## 2024-09-06 DIAGNOSIS — F90.0 ATTENTION-DEFICIT HYPERACTIVITY DISORDER, PREDOMINANTLY INATTENTIVE TYPE: ICD-10-CM

## 2024-09-06 DIAGNOSIS — F33.9 MAJOR DEPRESSIVE DISORDER, RECURRENT, UNSPECIFIED: ICD-10-CM

## 2024-09-06 PROCEDURE — 90792 PSYCH DIAG EVAL W/MED SRVCS: CPT | Mod: 95

## 2024-09-06 PROCEDURE — 90792 PSYCH DIAG EVAL W/MED SRVCS: CPT

## 2024-09-06 RX ORDER — DEXTROAMPHETAMINE SACCHARATE, AMPHETAMINE ASPARTATE MONOHYDRATE, DEXTROAMPHETAMINE SULFATE AND AMPHETAMINE SULFATE 1.25; 1.25; 1.25; 1.25 MG/1; MG/1; MG/1; MG/1
5 CAPSULE, EXTENDED RELEASE ORAL DAILY
Qty: 30 | Refills: 0 | Status: ACTIVE | COMMUNITY
Start: 2024-09-06 | End: 1900-01-01

## 2024-09-06 RX ORDER — DEXTROAMPHETAMINE SACCHARATE, AMPHETAMINE ASPARTATE, DEXTROAMPHETAMINE SULFATE AND AMPHETAMINE SULFATE 1.25; 1.25; 1.25; 1.25 MG/1; MG/1; MG/1; MG/1
5 TABLET ORAL
Qty: 30 | Refills: 0 | Status: ACTIVE | COMMUNITY
Start: 2024-09-06 | End: 1900-01-01

## 2024-09-06 NOTE — PAST MEDICAL HISTORY
[FreeTextEntry1] : on Zepbound as of 2/2024 --managed by Dr Jennyfer Neely (endo at [facility]); SE of constipation, managed w/ metamucil

## 2024-09-06 NOTE — PHYSICAL EXAM
[Cooperative] : cooperative [Depressed] : depressed [Constricted] : constricted [Soft] : soft [Linear/Goal Directed] : linear/goal directed [Average] : average [WNL] : within normal limits [Individual reports tobacco use during the last 30 days?] : Individual reports tobacco use during the last 30 days? No [Individual reports use of the following tobacco products during the last 30 days?] : Individual reports use of the following tobacco products during the last 30 days? No [Individual reports current use of tobacco cessation medication or nicotine replacement therapy?] : Individual reports current use of tobacco cessation medication or nicotine replacement therapy? No [Was tobacco cessation medication and/or nicotine replacement therapy recommended?] : Was tobacco cessation medication and/or nicotine replacement therapy recommended? No [Does individual accept referral to MD for cessation medication or NRT?] : Does individual accept referral to MD for cessation medication or NRT? No

## 2024-09-06 NOTE — HISTORY OF PRESENT ILLNESS
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, referred by her psychotherapist Dr Carmichael to [facility] for psychiatric evaluation and management of her ADHD. Patient with no history of psychiatric hospitalizations or suicide attempts. Patient denies any substance use history. She reports long-standing history of mild depression and anxiety throughout lifetime starting in childhood, but denies any formal treatment until adulthood. She reports initially seeking formal treatment around the time of the pandemic in 2021 when she started to notice increasing difficulty paying attention when switch to e-learning classes. She has been trialed on several different stimulant medication's to treat her ADHD in the past but reports various side effects, including headache and increased heart rate. She sought consultation from the cardiologist who confirmed that there were no abnormalities. She stopped treatment with psychiatrist at this clinic when he left the clinic, then briefly sought services from [facility] services from her school but did not feel that the DNP was taking her side effect concerns seriously so wanted to reconnect with the psychiatrist at the clinic. She is now looking to restart medication treatment as she feels that the increased workload in medical school is affecting her and she is not able to manage her symptoms effectively without the medications.   We explored different psychopharm treatment options, considering risks and benefits and side effect profiles. We also discussed comorbid depression and anxiety symptoms and importance of treating those. Patient was receptive to psychoeducation and is willing to engage in treatment. She denies any acute safety concerns at this time.  [FreeTextEntry2] : no IPP no SA/SIB no CARLA [FreeTextEntry3] : Dx with ADHD 2021 Adderall XR 10mg -- incr HR, headaches, effects wore off despite XR formulation Focalin -- headaches, chest pain Concerta --effects worse off  has not tried Wellbutrin, Vyvanse  Offered Wellbutrin given comorbid depression and anxiety and explained risks/benefits. She expressed understanding however at this time is choosing to restart Adderall given familiarity with the medication in the past. Given side effects in the past will start at a lower dose and titrate more cautiously and also discussed adding a booster immediate release dose in the afternoon as patient complained that in the past the medication effects were off.

## 2024-09-06 NOTE — RISK ASSESSMENT
[Clinical Interview] : Clinical Interview [ADHD] : ADHD [Depressed mood/Anhedonia] : depressed mood/anhedonia [Severe anxiety, agitation or panic] : severe anxiety, agitation or panic [Triggering events leading to humiliation, shame, and/or despair] : triggering events leading to humiliation, shame, and/or despair (e.g. loss of relationship, financial or health status) (real or anticipated) [Identifies reasons for living] : identifies reasons for living [Supportive social network of family or friends] : supportive social network of family or friends [Engaged in work or school] : engaged in work or school [None in the patient's lifetime] : None in the patient's lifetime [None Known] : none known [No known risk factors] : No known risk factors [Irritability] : irritability [Residential stability] : residential stability [Relationship stability] : relationship stability [Sobriety] : sobriety [Insight into violence risk and need for management/treatment] : insight into violence risk and need for management/treatment [Engagement in treatment] : engagement in treatment [Good treatment response/compliance] : good treatment response/compliance [No] : no

## 2024-09-06 NOTE — FAMILY HISTORY
Prescription approved per [facility] Refill Protocol.  ANGELICA Rene RN  [facility]    
[FreeTextEntry1] :  Family composition: mother, father, brother (19)   Family history and background: Born in [place]   Family relationship: "I have a difficult relationship with my mother bc she is very anxious, so she wants my location and wants to call me every day" "she is very critical of me so not a great relationship with her" "My dad is nice, but he is not very involved in my parenting because my mom had really strong opinions about it. My brother is very independent so he does not always reach out I mainly reach out, but I do keep in contact with him" Brother dx with ASD   Pertinent Family Medical, MH and Substance Use History including Adult Child of Alcoholic and child of substance abuse status, history of cancer and heart disease:  Mother heart disease, diabetes, "very anxious/ depression but not treated" Paternal grandmother heart disease Maternal grandmother () diabetes, heart failure Maternal grandfather colon CA  Father hypothyroidism Maternal aunt (Chron's disease)

## 2024-09-06 NOTE — HEALTH RISK ASSESSMENT
[With Patient/Caregiver] : , with patient/caregiver [With Patient/Collateral] : , with patient/collateral [AdvancecareDate] : 8/21/2024

## 2024-09-06 NOTE — DISCUSSION/SUMMARY
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 Pt also with hx of chronic depression and anxiety symptoms. Pt is looking to restart psychopharm treatment at this time. She was treated with various agents in the past but stopped due to side effects and provider leaving clinic.

## 2024-09-06 NOTE — REASON FOR VISIT
[Patient preference] : as per patient preference [Access issues (e.g., transportation, impaired mobility, etc.)] : due to patient's access issues [Telehealth (audio & video) - Individual/Group] : This visit was provided via telehealth using real-time 2-way audio visual technology. [Other Location: e.g. Home (Enter Location, City,State)___] : The provider was located at [unfilled]. [Patient's space is appropriate for telehealth and maintains privacy/confidentiality.] : Patient's space is appropriate for telehealth and maintains privacy/confidentiality. [Verbal consent obtained from patient/other participant(s)] : Verbal consent for telehealth/telephonic services obtained from patient/other participant(s) [Home] : The patient, [unfilled], was located at home, [unfilled], at the time of the visit. [Number can be texted] : number can be texted [OK  to leave message] : OK  to leave message [Specialty Physician] : Specialty Physician [[facility] Provider/Facility] : [facility] Provider/Facility [Patient] : Patient [FreeTextEntry3] : [email or web address] [FreeTextEntry5] : English [FreeTextEntry6] : Jo [FreeTextEntry7] : she/her/hers [FreeTextEntry2] : ADHD/ anxiety  [FreeTextEntry1] : ADHD/ anxiety

## 2024-09-06 NOTE — PLAN
[Admit to Program     (Add Program Admission information to a new column in the Admit/Discharge Flowsheet)] : Admit to program [Every ___ week(s)] : Medication Management: Every [unfilled] week(s) [FreeTextEntry4] : #ADHD - start Adderall XR 5mg qAM + 5mg IR as booster in the afternoons - f/u in 2 weeks or sooner PRN - pt is aware of safety procedures; there are no known acute safety concerns at this time - continue therapy with Dr Mirian Carmichael

## 2024-09-06 NOTE — SOCIAL HISTORY
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 "Culturally I engage in Advent practices, holidays and festivals"   Do you consider yourself spiritual or Sikh?  "Neither"   Is there something you believe in that gives meaning to your life? "I want to do good in my life"   Is it important in your life? "Yes"   What influence does it have on how you take care of yourself? "I think it has more to do with my career in some ways I am more motivated to take care of myself so that I can have more energy for studying and volunteering"    How have your beliefs influenced your behavior during this illness? "Having the goal of wanting to become a doctor and helping people helps motivate me to study more"    What role do your beliefs play in regaining your health? "Providing additional motivation to push through whatever feeling or difficulty I may have"   Are you part of a spiritual or Sikh community? "Advent community"   Is this of support to you and how? "I do not go to the temple often but holding cultural practices are important to me and I find them to be joyful"   Is there a person or group of people you really love or who are really important to you? "My friends and family"   We have been discussing your belief and supports. What else gives you internal support? "No"   What are your sources of hope, strength, comfort and peace? "People who are my role models, comfort from my closest friends, my friends are also helpful for strength and peace"   What do you hold on to during difficult times? "I problem solve so I can think about it logically"

## 2024-09-07 DIAGNOSIS — F41.9 ANXIETY DISORDER, UNSPECIFIED: ICD-10-CM

## 2024-09-07 DIAGNOSIS — F33.9 MAJOR DEPRESSIVE DISORDER, RECURRENT, UNSPECIFIED: ICD-10-CM

## 2024-09-07 DIAGNOSIS — F90.0 ATTENTION-DEFICIT HYPERACTIVITY DISORDER, PREDOMINANTLY INATTENTIVE TYPE: ICD-10-CM

## 2024-09-10 ENCOUNTER — NON-APPOINTMENT (OUTPATIENT)
Age: 24
End: 2024-09-10

## 2024-09-11 ENCOUNTER — APPOINTMENT (OUTPATIENT)
Dept: NEUROPSYCHOLOGY | Facility: CLINIC | Age: 24
End: 2024-09-11

## 2024-09-11 ENCOUNTER — OUTPATIENT (OUTPATIENT)
Dept: OUTPATIENT SERVICES | Facility: HOSPITAL | Age: 24
LOS: 1 days | End: 2024-09-11
Payer: COMMERCIAL

## 2024-09-11 DIAGNOSIS — F33.1 MAJOR DEPRESSIVE DISORDER, RECURRENT, MODERATE: ICD-10-CM

## 2024-09-11 PROCEDURE — 90837 PSYTX W PT 60 MINUTES: CPT | Mod: 95

## 2024-09-12 DIAGNOSIS — F33.1 MAJOR DEPRESSIVE DISORDER, RECURRENT, MODERATE: ICD-10-CM

## 2024-09-23 ENCOUNTER — APPOINTMENT (OUTPATIENT)
Dept: PSYCHIATRY | Facility: CLINIC | Age: 24
End: 2024-09-23
Payer: COMMERCIAL

## 2024-09-23 ENCOUNTER — OUTPATIENT (OUTPATIENT)
Dept: OUTPATIENT SERVICES | Facility: HOSPITAL | Age: 24
LOS: 1 days | End: 2024-09-23
Payer: COMMERCIAL

## 2024-09-23 DIAGNOSIS — F33.9 MAJOR DEPRESSIVE DISORDER, RECURRENT, UNSPECIFIED: ICD-10-CM

## 2024-09-23 DIAGNOSIS — F41.9 ANXIETY DISORDER, UNSPECIFIED: ICD-10-CM

## 2024-09-23 DIAGNOSIS — F90.0 ATTENTION-DEFICIT HYPERACTIVITY DISORDER, PREDOMINANTLY INATTENTIVE TYPE: ICD-10-CM

## 2024-09-23 PROCEDURE — 99214 OFFICE O/P EST MOD 30 MIN: CPT | Mod: 95

## 2024-09-23 NOTE — REASON FOR VISIT
[Patient preference] : as per patient preference [Access issues (e.g., transportation, impaired mobility, etc.)] : due to patient's access issues [Continuity of care] : to ensure continuity of care [Telehealth (audio & video) - Individual/Group] : This visit was provided via telehealth using real-time 2-way audio visual technology. [Other Location: e.g. Home (Enter Location, City,State)___] : The provider was located at [unfilled]. [Home] : The patient, [unfilled], was located at home, [unfilled], at the time of the visit. [Verbal consent obtained from patient/other participant(s)] : Verbal consent for telehealth/telephonic services obtained from patient/other participant(s) [Patient] : Patient

## 2024-09-24 DIAGNOSIS — F90.0 ATTENTION-DEFICIT HYPERACTIVITY DISORDER, PREDOMINANTLY INATTENTIVE TYPE: ICD-10-CM

## 2024-09-25 NOTE — DISCUSSION/SUMMARY
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 Pt also with hx of chronic depression and anxiety symptoms. Pt is looking to restart psychopharm treatment at this time. She was treated with various agents in the past but stopped due to side effects and provider leaving clinic.

## 2024-09-25 NOTE — PLAN
[Yes. details: ___] : Yes, [unfilled] [Medication education provided] : Medication education provided. [Rationale for medication choices, possible risks/precautions, benefits, alternative treatment choices, and consequences of non-treatment discussed] : Rationale for medication choices, possible risks/precautions, benefits, alternative treatment choices, and consequences of non-treatment discussed with patient/family/caregiver  [FreeTextEntry5] : #ADHD - continue Adderall XR 10mg qAM + 5mg IR as booster in the afternoons - f/u in 2-3 weeks or sooner PRN - pt is aware of safety procedures; there are no known acute safety concerns at this time - continue therapy with Dr Mirian Carmichael

## 2024-09-25 NOTE — DISCUSSION/SUMMARY
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 Pt also with hx of chronic depression and anxiety symptoms. Pt is looking to restart psychopharm treatment at this time. She was treated with various agents in the past but stopped due to side effects and provider leaving clinic.

## 2024-09-25 NOTE — HISTORY OF PRESENT ILLNESS
[FreeTextEntry1] : Pt presents for scheduled medication management follow up. She reports some response to starting stimulant medication, increased AM dose to 10mg XR of Adderall, but has held off on PM booster dose for now. She noticed sleep change since initiating stimulant despite taking it at regular times and not taking PM dose. Maintains good sleep hygiene, sleeping around 11pm-7am each night. Does wear CPAP machine at night for sleep apnea but denies any changes to this. States she continues to sleep regularly but noticed she does not feel well rested the next day, feels more tired, so quality of sleep appears to be affected. Will continue to monitor and encouraged pt to start PM booster.  Pt denies acute mood fluctuations or other mood changes. She denies acute manic or psychotic symptoms. She is future-oriented, engaged in school, is help-seeking adn denies any acute safety concerns at this time.

## 2024-09-25 NOTE — DISCUSSION/SUMMARY
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 Pt also with hx of chronic depression and anxiety symptoms. Pt is looking to restart psychopharm treatment at this time. She was treated with various agents in the past but stopped due to side effects and provider leaving clinic.

## 2024-09-25 NOTE — PHYSICAL EXAM
[Cooperative] : cooperative [Depressed] : depressed [Constricted] : constricted [Soft] : soft [Linear/Goal Directed] : linear/goal directed [Average] : average [WNL] : within normal limits

## 2024-09-27 NOTE — DISCUSSION/SUMMARY
[FreeTextEntry1] : Patient called regarding unavailability of her extended release formulation at Current pharmacy due to stimulant shortage. Found alternate pharmacy spoke with the pharmacist and prescriptions were sent there. Called patient and confirmed.

## 2024-10-16 ENCOUNTER — OUTPATIENT (OUTPATIENT)
Dept: OUTPATIENT SERVICES | Facility: HOSPITAL | Age: 24
LOS: 1 days | End: 2024-10-16
Payer: COMMERCIAL

## 2024-10-16 ENCOUNTER — APPOINTMENT (OUTPATIENT)
Dept: PSYCHIATRY | Facility: CLINIC | Age: 24
End: 2024-10-16
Payer: COMMERCIAL

## 2024-10-16 DIAGNOSIS — F90.0 ATTENTION-DEFICIT HYPERACTIVITY DISORDER, PREDOMINANTLY INATTENTIVE TYPE: ICD-10-CM

## 2024-10-16 DIAGNOSIS — F41.9 ANXIETY DISORDER, UNSPECIFIED: ICD-10-CM

## 2024-10-16 DIAGNOSIS — F33.9 MAJOR DEPRESSIVE DISORDER, RECURRENT, UNSPECIFIED: ICD-10-CM

## 2024-10-16 PROCEDURE — 99214 OFFICE O/P EST MOD 30 MIN: CPT | Mod: 95

## 2024-10-16 RX ORDER — BUPROPION HYDROCHLORIDE 150 MG/1
150 TABLET, EXTENDED RELEASE ORAL DAILY
Qty: 30 | Refills: 0 | Status: ACTIVE | COMMUNITY
Start: 2024-10-16 | End: 1900-01-01

## 2024-10-17 DIAGNOSIS — F33.9 MAJOR DEPRESSIVE DISORDER, RECURRENT, UNSPECIFIED: ICD-10-CM

## 2024-10-17 DIAGNOSIS — F41.9 ANXIETY DISORDER, UNSPECIFIED: ICD-10-CM

## 2024-10-17 DIAGNOSIS — F90.0 ATTENTION-DEFICIT HYPERACTIVITY DISORDER, PREDOMINANTLY INATTENTIVE TYPE: ICD-10-CM

## 2024-10-18 NOTE — ASSESSMENT
[FreeTextEntry1] : REcommend Costume molded orthotics to prevent further deformity\par OTC orthotic if no Costume made ones available \par Rx Orthotics\par Recommend stretching exercise and strengthening exercise\par RTO PRN \par  Alert and oriented, no focal deficits, no motor or sensory deficits. DASI score 9.89brisk walk 3 miles every other day/4-10 METS

## 2024-10-30 ENCOUNTER — OUTPATIENT (OUTPATIENT)
Dept: OUTPATIENT SERVICES | Facility: HOSPITAL | Age: 24
LOS: 1 days | End: 2024-10-30
Payer: COMMERCIAL

## 2024-10-30 ENCOUNTER — APPOINTMENT (OUTPATIENT)
Dept: PSYCHIATRY | Facility: CLINIC | Age: 24
End: 2024-10-30
Payer: COMMERCIAL

## 2024-10-30 DIAGNOSIS — F41.9 ANXIETY DISORDER, UNSPECIFIED: ICD-10-CM

## 2024-10-30 DIAGNOSIS — F90.0 ATTENTION-DEFICIT HYPERACTIVITY DISORDER, PREDOMINANTLY INATTENTIVE TYPE: ICD-10-CM

## 2024-10-30 DIAGNOSIS — F33.9 MAJOR DEPRESSIVE DISORDER, RECURRENT, UNSPECIFIED: ICD-10-CM

## 2024-10-30 PROCEDURE — 99214 OFFICE O/P EST MOD 30 MIN: CPT | Mod: 95

## 2024-10-31 DIAGNOSIS — F33.9 MAJOR DEPRESSIVE DISORDER, RECURRENT, UNSPECIFIED: ICD-10-CM

## 2024-10-31 DIAGNOSIS — F90.0 ATTENTION-DEFICIT HYPERACTIVITY DISORDER, PREDOMINANTLY INATTENTIVE TYPE: ICD-10-CM

## 2024-10-31 DIAGNOSIS — F41.9 ANXIETY DISORDER, UNSPECIFIED: ICD-10-CM

## 2024-11-20 ENCOUNTER — APPOINTMENT (OUTPATIENT)
Dept: PSYCHIATRY | Facility: CLINIC | Age: 24
End: 2024-11-20

## 2024-11-21 DIAGNOSIS — Z00.00 ENCOUNTER FOR GENERAL ADULT MEDICAL EXAMINATION W/OUT ABNORMAL FINDINGS: ICD-10-CM

## 2024-11-21 RX ORDER — SALMONELLA TYPHI TY21A 6000000000 [CFU]/1
CAPSULE, COATED ORAL
Qty: 1 | Refills: 0 | Status: ACTIVE | COMMUNITY
Start: 2024-11-21 | End: 1900-01-01

## 2024-11-27 ENCOUNTER — LABORATORY RESULT (OUTPATIENT)
Age: 24
End: 2024-11-27

## 2024-11-27 ENCOUNTER — OUTPATIENT (OUTPATIENT)
Dept: OUTPATIENT SERVICES | Facility: HOSPITAL | Age: 24
LOS: 1 days | End: 2024-11-27
Payer: COMMERCIAL

## 2024-11-27 PROCEDURE — 83690 ASSAY OF LIPASE: CPT

## 2024-11-27 PROCEDURE — 83735 ASSAY OF MAGNESIUM: CPT

## 2024-11-27 PROCEDURE — 82150 ASSAY OF AMYLASE: CPT

## 2024-11-27 PROCEDURE — 86708 HEPATITIS A ANTIBODY: CPT

## 2024-11-27 PROCEDURE — 80053 COMPREHEN METABOLIC PANEL: CPT

## 2024-11-27 PROCEDURE — 85027 COMPLETE CBC AUTOMATED: CPT

## 2024-11-27 PROCEDURE — 80061 LIPID PANEL: CPT

## 2024-11-27 PROCEDURE — 83036 HEMOGLOBIN GLYCOSYLATED A1C: CPT

## 2024-11-29 ENCOUNTER — OUTPATIENT (OUTPATIENT)
Dept: OUTPATIENT SERVICES | Facility: HOSPITAL | Age: 24
LOS: 1 days | End: 2024-11-29
Payer: COMMERCIAL

## 2024-11-29 ENCOUNTER — APPOINTMENT (OUTPATIENT)
Dept: PSYCHIATRY | Facility: CLINIC | Age: 24
End: 2024-11-29
Payer: COMMERCIAL

## 2024-11-29 DIAGNOSIS — F90.0 ATTENTION-DEFICIT HYPERACTIVITY DISORDER, PREDOMINANTLY INATTENTIVE TYPE: ICD-10-CM

## 2024-11-29 DIAGNOSIS — F41.9 ANXIETY DISORDER, UNSPECIFIED: ICD-10-CM

## 2024-11-29 DIAGNOSIS — F33.9 MAJOR DEPRESSIVE DISORDER, RECURRENT, UNSPECIFIED: ICD-10-CM

## 2024-11-29 PROCEDURE — 99214 OFFICE O/P EST MOD 30 MIN: CPT | Mod: 95

## 2024-11-30 DIAGNOSIS — F90.0 ATTENTION-DEFICIT HYPERACTIVITY DISORDER, PREDOMINANTLY INATTENTIVE TYPE: ICD-10-CM

## 2024-11-30 DIAGNOSIS — F33.9 MAJOR DEPRESSIVE DISORDER, RECURRENT, UNSPECIFIED: ICD-10-CM

## 2024-12-05 DIAGNOSIS — F90.0 ATTENTION-DEFICIT HYPERACTIVITY DISORDER, PREDOMINANTLY INATTENTIVE TYPE: ICD-10-CM

## 2024-12-06 DIAGNOSIS — F90.0 ATTENTION-DEFICIT HYPERACTIVITY DISORDER, PREDOMINANTLY INATTENTIVE TYPE: ICD-10-CM

## 2024-12-09 ENCOUNTER — APPOINTMENT (OUTPATIENT)
Dept: NEUROPSYCHOLOGY | Facility: CLINIC | Age: 24
End: 2024-12-09

## 2024-12-09 ENCOUNTER — OUTPATIENT (OUTPATIENT)
Dept: OUTPATIENT SERVICES | Facility: HOSPITAL | Age: 24
LOS: 1 days | End: 2024-12-09
Payer: COMMERCIAL

## 2024-12-09 DIAGNOSIS — F33.1 MAJOR DEPRESSIVE DISORDER, RECURRENT, MODERATE: ICD-10-CM

## 2024-12-09 PROCEDURE — 90837 PSYTX W PT 60 MINUTES: CPT | Mod: 95

## 2024-12-10 DIAGNOSIS — F33.1 MAJOR DEPRESSIVE DISORDER, RECURRENT, MODERATE: ICD-10-CM

## 2024-12-13 ENCOUNTER — OUTPATIENT (OUTPATIENT)
Dept: OUTPATIENT SERVICES | Facility: HOSPITAL | Age: 24
LOS: 1 days | End: 2024-12-13
Payer: COMMERCIAL

## 2024-12-13 ENCOUNTER — APPOINTMENT (OUTPATIENT)
Dept: PSYCHIATRY | Facility: CLINIC | Age: 24
End: 2024-12-13
Payer: COMMERCIAL

## 2024-12-13 DIAGNOSIS — F90.0 ATTENTION-DEFICIT HYPERACTIVITY DISORDER, PREDOMINANTLY INATTENTIVE TYPE: ICD-10-CM

## 2024-12-13 DIAGNOSIS — F41.9 ANXIETY DISORDER, UNSPECIFIED: ICD-10-CM

## 2024-12-13 DIAGNOSIS — F33.9 MAJOR DEPRESSIVE DISORDER, RECURRENT, UNSPECIFIED: ICD-10-CM

## 2024-12-13 PROCEDURE — 99214 OFFICE O/P EST MOD 30 MIN: CPT | Mod: 95

## 2024-12-14 DIAGNOSIS — F90.0 ATTENTION-DEFICIT HYPERACTIVITY DISORDER, PREDOMINANTLY INATTENTIVE TYPE: ICD-10-CM

## 2024-12-14 DIAGNOSIS — F33.9 MAJOR DEPRESSIVE DISORDER, RECURRENT, UNSPECIFIED: ICD-10-CM

## 2024-12-14 DIAGNOSIS — F41.9 ANXIETY DISORDER, UNSPECIFIED: ICD-10-CM

## 2025-01-07 ENCOUNTER — OUTPATIENT (OUTPATIENT)
Dept: OUTPATIENT SERVICES | Facility: HOSPITAL | Age: 25
LOS: 1 days | End: 2025-01-07
Payer: COMMERCIAL

## 2025-01-07 ENCOUNTER — APPOINTMENT (OUTPATIENT)
Dept: PSYCHIATRY | Facility: CLINIC | Age: 25
End: 2025-01-07
Payer: COMMERCIAL

## 2025-01-07 DIAGNOSIS — F90.0 ATTENTION-DEFICIT HYPERACTIVITY DISORDER, PREDOMINANTLY INATTENTIVE TYPE: ICD-10-CM

## 2025-01-07 DIAGNOSIS — F41.9 ANXIETY DISORDER, UNSPECIFIED: ICD-10-CM

## 2025-01-07 DIAGNOSIS — F33.9 MAJOR DEPRESSIVE DISORDER, RECURRENT, UNSPECIFIED: ICD-10-CM

## 2025-01-07 PROCEDURE — 99214 OFFICE O/P EST MOD 30 MIN: CPT | Mod: 95

## 2025-01-07 PROCEDURE — 98007 SYNCH AUDIO-VIDEO EST HI 40: CPT

## 2025-01-08 DIAGNOSIS — F90.0 ATTENTION-DEFICIT HYPERACTIVITY DISORDER, PREDOMINANTLY INATTENTIVE TYPE: ICD-10-CM

## 2025-01-08 DIAGNOSIS — F33.9 MAJOR DEPRESSIVE DISORDER, RECURRENT, UNSPECIFIED: ICD-10-CM

## 2025-01-10 ENCOUNTER — OUTPATIENT (OUTPATIENT)
Dept: OUTPATIENT SERVICES | Facility: HOSPITAL | Age: 25
LOS: 1 days | End: 2025-01-10
Payer: COMMERCIAL

## 2025-01-10 ENCOUNTER — APPOINTMENT (OUTPATIENT)
Dept: NEUROPSYCHOLOGY | Facility: CLINIC | Age: 25
End: 2025-01-10

## 2025-01-10 DIAGNOSIS — F33.1 MAJOR DEPRESSIVE DISORDER, RECURRENT, MODERATE: ICD-10-CM

## 2025-01-10 PROCEDURE — 90837 PSYTX W PT 60 MINUTES: CPT | Mod: 95

## 2025-01-10 RX ORDER — ESCITALOPRAM OXALATE 10 MG/1
10 TABLET ORAL DAILY
Qty: 30 | Refills: 0 | Status: ACTIVE | COMMUNITY
Start: 2025-01-07 | End: 1900-01-01

## 2025-01-11 DIAGNOSIS — F33.1 MAJOR DEPRESSIVE DISORDER, RECURRENT, MODERATE: ICD-10-CM

## 2025-01-14 ENCOUNTER — APPOINTMENT (OUTPATIENT)
Dept: NEUROPSYCHOLOGY | Facility: CLINIC | Age: 25
End: 2025-01-14

## 2025-01-22 ENCOUNTER — OUTPATIENT (OUTPATIENT)
Dept: OUTPATIENT SERVICES | Facility: HOSPITAL | Age: 25
LOS: 1 days | End: 2025-01-22
Payer: COMMERCIAL

## 2025-01-22 ENCOUNTER — APPOINTMENT (OUTPATIENT)
Dept: NEUROPSYCHOLOGY | Facility: CLINIC | Age: 25
End: 2025-01-22

## 2025-01-22 DIAGNOSIS — F33.1 MAJOR DEPRESSIVE DISORDER, RECURRENT, MODERATE: ICD-10-CM

## 2025-01-22 PROCEDURE — 90837 PSYTX W PT 60 MINUTES: CPT | Mod: 95

## 2025-01-23 DIAGNOSIS — F33.1 MAJOR DEPRESSIVE DISORDER, RECURRENT, MODERATE: ICD-10-CM

## 2025-01-27 ENCOUNTER — APPOINTMENT (OUTPATIENT)
Dept: NEUROPSYCHOLOGY | Facility: CLINIC | Age: 25
End: 2025-01-27

## 2025-02-03 ENCOUNTER — APPOINTMENT (OUTPATIENT)
Dept: NEUROPSYCHOLOGY | Facility: CLINIC | Age: 25
End: 2025-02-03

## 2025-02-04 ENCOUNTER — RX RENEWAL (OUTPATIENT)
Age: 25
End: 2025-02-04

## 2025-02-05 ENCOUNTER — TRANSCRIPTION ENCOUNTER (OUTPATIENT)
Age: 25
End: 2025-02-05

## 2025-02-07 ENCOUNTER — APPOINTMENT (OUTPATIENT)
Dept: NEUROPSYCHOLOGY | Facility: CLINIC | Age: 25
End: 2025-02-07

## 2025-02-07 ENCOUNTER — OUTPATIENT (OUTPATIENT)
Dept: OUTPATIENT SERVICES | Facility: HOSPITAL | Age: 25
LOS: 1 days | End: 2025-02-07
Payer: COMMERCIAL

## 2025-02-07 ENCOUNTER — APPOINTMENT (OUTPATIENT)
Dept: PSYCHIATRY | Facility: CLINIC | Age: 25
End: 2025-02-07
Payer: COMMERCIAL

## 2025-02-07 DIAGNOSIS — F33.1 MAJOR DEPRESSIVE DISORDER, RECURRENT, MODERATE: ICD-10-CM

## 2025-02-07 DIAGNOSIS — F33.9 MAJOR DEPRESSIVE DISORDER, RECURRENT, UNSPECIFIED: ICD-10-CM

## 2025-02-07 DIAGNOSIS — F90.0 ATTENTION-DEFICIT HYPERACTIVITY DISORDER, PREDOMINANTLY INATTENTIVE TYPE: ICD-10-CM

## 2025-02-07 DIAGNOSIS — F41.9 ANXIETY DISORDER, UNSPECIFIED: ICD-10-CM

## 2025-02-07 PROCEDURE — 90837 PSYTX W PT 60 MINUTES: CPT | Mod: 95

## 2025-02-07 PROCEDURE — 99214 OFFICE O/P EST MOD 30 MIN: CPT | Mod: 95

## 2025-02-07 PROCEDURE — 98007 SYNCH AUDIO-VIDEO EST HI 40: CPT

## 2025-02-07 RX ORDER — ESCITALOPRAM OXALATE 5 MG/1
5 TABLET ORAL
Qty: 30 | Refills: 1 | Status: ACTIVE | COMMUNITY
Start: 2025-02-07 | End: 1900-01-01

## 2025-02-08 DIAGNOSIS — F33.1 MAJOR DEPRESSIVE DISORDER, RECURRENT, MODERATE: ICD-10-CM

## 2025-02-08 DIAGNOSIS — F33.9 MAJOR DEPRESSIVE DISORDER, RECURRENT, UNSPECIFIED: ICD-10-CM

## 2025-02-08 DIAGNOSIS — F41.9 ANXIETY DISORDER, UNSPECIFIED: ICD-10-CM

## 2025-02-08 DIAGNOSIS — F90.0 ATTENTION-DEFICIT HYPERACTIVITY DISORDER, PREDOMINANTLY INATTENTIVE TYPE: ICD-10-CM

## 2025-02-10 ENCOUNTER — APPOINTMENT (OUTPATIENT)
Dept: NEUROPSYCHOLOGY | Facility: CLINIC | Age: 25
End: 2025-02-10

## 2025-02-21 ENCOUNTER — OUTPATIENT (OUTPATIENT)
Dept: OUTPATIENT SERVICES | Facility: HOSPITAL | Age: 25
LOS: 1 days | End: 2025-02-21

## 2025-02-21 ENCOUNTER — APPOINTMENT (OUTPATIENT)
Dept: NEUROPSYCHOLOGY | Facility: CLINIC | Age: 25
End: 2025-02-21

## 2025-02-21 DIAGNOSIS — F33.1 MAJOR DEPRESSIVE DISORDER, RECURRENT, MODERATE: ICD-10-CM

## 2025-02-24 ENCOUNTER — APPOINTMENT (OUTPATIENT)
Dept: NEUROPSYCHOLOGY | Facility: CLINIC | Age: 25
End: 2025-02-24

## 2025-03-03 ENCOUNTER — APPOINTMENT (OUTPATIENT)
Dept: NEUROPSYCHOLOGY | Facility: CLINIC | Age: 25
End: 2025-03-03

## 2025-03-07 ENCOUNTER — APPOINTMENT (OUTPATIENT)
Dept: PSYCHIATRY | Facility: CLINIC | Age: 25
End: 2025-03-07
Payer: COMMERCIAL

## 2025-03-07 ENCOUNTER — OUTPATIENT (OUTPATIENT)
Dept: OUTPATIENT SERVICES | Facility: HOSPITAL | Age: 25
LOS: 1 days | End: 2025-03-07
Payer: COMMERCIAL

## 2025-03-07 ENCOUNTER — APPOINTMENT (OUTPATIENT)
Dept: NEUROPSYCHOLOGY | Facility: CLINIC | Age: 25
End: 2025-03-07

## 2025-03-07 DIAGNOSIS — F41.9 ANXIETY DISORDER, UNSPECIFIED: ICD-10-CM

## 2025-03-07 DIAGNOSIS — F90.0 ATTENTION-DEFICIT HYPERACTIVITY DISORDER, PREDOMINANTLY INATTENTIVE TYPE: ICD-10-CM

## 2025-03-07 DIAGNOSIS — F33.9 MAJOR DEPRESSIVE DISORDER, RECURRENT, UNSPECIFIED: ICD-10-CM

## 2025-03-07 PROCEDURE — 99214 OFFICE O/P EST MOD 30 MIN: CPT | Mod: 95

## 2025-03-07 PROCEDURE — 98007 SYNCH AUDIO-VIDEO EST HI 40: CPT

## 2025-03-07 PROCEDURE — 90837 PSYTX W PT 60 MINUTES: CPT | Mod: 95

## 2025-03-08 DIAGNOSIS — F90.0 ATTENTION-DEFICIT HYPERACTIVITY DISORDER, PREDOMINANTLY INATTENTIVE TYPE: ICD-10-CM

## 2025-03-08 DIAGNOSIS — F33.9 MAJOR DEPRESSIVE DISORDER, RECURRENT, UNSPECIFIED: ICD-10-CM

## 2025-03-10 ENCOUNTER — APPOINTMENT (OUTPATIENT)
Dept: NEUROPSYCHOLOGY | Facility: CLINIC | Age: 25
End: 2025-03-10

## 2025-03-14 ENCOUNTER — TRANSCRIPTION ENCOUNTER (OUTPATIENT)
Age: 25
End: 2025-03-14

## 2025-03-17 ENCOUNTER — APPOINTMENT (OUTPATIENT)
Dept: NEUROPSYCHOLOGY | Facility: CLINIC | Age: 25
End: 2025-03-17

## 2025-03-24 ENCOUNTER — APPOINTMENT (OUTPATIENT)
Dept: NEUROPSYCHOLOGY | Facility: CLINIC | Age: 25
End: 2025-03-24

## 2025-03-28 ENCOUNTER — APPOINTMENT (OUTPATIENT)
Dept: NEUROPSYCHOLOGY | Facility: CLINIC | Age: 25
End: 2025-03-28

## 2025-03-28 ENCOUNTER — OUTPATIENT (OUTPATIENT)
Dept: OUTPATIENT SERVICES | Facility: HOSPITAL | Age: 25
LOS: 1 days | End: 2025-03-28

## 2025-03-28 DIAGNOSIS — F33.1 MAJOR DEPRESSIVE DISORDER, RECURRENT, MODERATE: ICD-10-CM

## 2025-03-29 DIAGNOSIS — F33.1 MAJOR DEPRESSIVE DISORDER, RECURRENT, MODERATE: ICD-10-CM

## 2025-04-02 ENCOUNTER — OUTPATIENT (OUTPATIENT)
Dept: OUTPATIENT SERVICES | Facility: HOSPITAL | Age: 25
LOS: 1 days | End: 2025-04-02
Payer: COMMERCIAL

## 2025-04-02 ENCOUNTER — APPOINTMENT (OUTPATIENT)
Dept: PSYCHIATRY | Facility: CLINIC | Age: 25
End: 2025-04-02
Payer: COMMERCIAL

## 2025-04-02 DIAGNOSIS — F90.0 ATTENTION-DEFICIT HYPERACTIVITY DISORDER, PREDOMINANTLY INATTENTIVE TYPE: ICD-10-CM

## 2025-04-02 DIAGNOSIS — F33.9 MAJOR DEPRESSIVE DISORDER, RECURRENT, UNSPECIFIED: ICD-10-CM

## 2025-04-02 DIAGNOSIS — F41.9 ANXIETY DISORDER, UNSPECIFIED: ICD-10-CM

## 2025-04-02 PROCEDURE — 99214 OFFICE O/P EST MOD 30 MIN: CPT | Mod: 95

## 2025-04-02 PROCEDURE — 98007 SYNCH AUDIO-VIDEO EST HI 40: CPT

## 2025-04-03 DIAGNOSIS — F90.0 ATTENTION-DEFICIT HYPERACTIVITY DISORDER, PREDOMINANTLY INATTENTIVE TYPE: ICD-10-CM

## 2025-04-03 DIAGNOSIS — F33.9 MAJOR DEPRESSIVE DISORDER, RECURRENT, UNSPECIFIED: ICD-10-CM

## 2025-04-04 RX ORDER — ESCITALOPRAM OXALATE 5 MG/1
5 TABLET ORAL
Qty: 30 | Refills: 2 | Status: ACTIVE | COMMUNITY
Start: 2025-04-04 | End: 1900-01-01

## 2025-04-11 DIAGNOSIS — F33.1 MAJOR DEPRESSIVE DISORDER, RECURRENT, MODERATE: ICD-10-CM

## 2025-04-18 ENCOUNTER — APPOINTMENT (OUTPATIENT)
Dept: NEUROPSYCHOLOGY | Facility: CLINIC | Age: 25
End: 2025-04-18

## 2025-04-18 ENCOUNTER — OUTPATIENT (OUTPATIENT)
Dept: OUTPATIENT SERVICES | Facility: HOSPITAL | Age: 25
LOS: 1 days | End: 2025-04-18
Payer: COMMERCIAL

## 2025-04-18 DIAGNOSIS — F33.1 MAJOR DEPRESSIVE DISORDER, RECURRENT, MODERATE: ICD-10-CM

## 2025-04-18 PROCEDURE — 90837 PSYTX W PT 60 MINUTES: CPT | Mod: 95

## 2025-04-19 DIAGNOSIS — F33.1 MAJOR DEPRESSIVE DISORDER, RECURRENT, MODERATE: ICD-10-CM

## 2025-04-23 ENCOUNTER — OUTPATIENT (OUTPATIENT)
Dept: OUTPATIENT SERVICES | Facility: HOSPITAL | Age: 25
LOS: 1 days | End: 2025-04-23
Payer: COMMERCIAL

## 2025-04-23 ENCOUNTER — APPOINTMENT (OUTPATIENT)
Dept: PSYCHIATRY | Facility: CLINIC | Age: 25
End: 2025-04-23
Payer: COMMERCIAL

## 2025-04-23 DIAGNOSIS — F33.9 MAJOR DEPRESSIVE DISORDER, RECURRENT, UNSPECIFIED: ICD-10-CM

## 2025-04-23 DIAGNOSIS — F90.0 ATTENTION-DEFICIT HYPERACTIVITY DISORDER, PREDOMINANTLY INATTENTIVE TYPE: ICD-10-CM

## 2025-04-23 DIAGNOSIS — F41.9 ANXIETY DISORDER, UNSPECIFIED: ICD-10-CM

## 2025-04-23 PROCEDURE — 98007 SYNCH AUDIO-VIDEO EST HI 40: CPT

## 2025-04-23 PROCEDURE — 99214 OFFICE O/P EST MOD 30 MIN: CPT | Mod: 95

## 2025-04-24 DIAGNOSIS — F90.0 ATTENTION-DEFICIT HYPERACTIVITY DISORDER, PREDOMINANTLY INATTENTIVE TYPE: ICD-10-CM

## 2025-04-24 DIAGNOSIS — F33.9 MAJOR DEPRESSIVE DISORDER, RECURRENT, UNSPECIFIED: ICD-10-CM

## 2025-04-25 RX ORDER — SERTRALINE HYDROCHLORIDE 50 MG/1
50 TABLET, FILM COATED ORAL DAILY
Qty: 30 | Refills: 1 | Status: ACTIVE | COMMUNITY
Start: 2025-04-23 | End: 1900-01-01

## 2025-05-05 ENCOUNTER — APPOINTMENT (OUTPATIENT)
Dept: NEUROPSYCHOLOGY | Facility: CLINIC | Age: 25
End: 2025-05-05

## 2025-05-05 ENCOUNTER — OUTPATIENT (OUTPATIENT)
Dept: OUTPATIENT SERVICES | Facility: HOSPITAL | Age: 25
LOS: 1 days | End: 2025-05-05
Payer: COMMERCIAL

## 2025-05-05 DIAGNOSIS — F33.1 MAJOR DEPRESSIVE DISORDER, RECURRENT, MODERATE: ICD-10-CM

## 2025-05-05 PROCEDURE — 90837 PSYTX W PT 60 MINUTES: CPT | Mod: 95

## 2025-05-06 DIAGNOSIS — F33.1 MAJOR DEPRESSIVE DISORDER, RECURRENT, MODERATE: ICD-10-CM

## 2025-05-09 ENCOUNTER — APPOINTMENT (OUTPATIENT)
Dept: PSYCHIATRY | Facility: CLINIC | Age: 25
End: 2025-05-09
Payer: COMMERCIAL

## 2025-05-09 ENCOUNTER — OUTPATIENT (OUTPATIENT)
Dept: OUTPATIENT SERVICES | Facility: HOSPITAL | Age: 25
LOS: 1 days | End: 2025-05-09
Payer: COMMERCIAL

## 2025-05-09 DIAGNOSIS — G47.00 INSOMNIA, UNSPECIFIED: ICD-10-CM

## 2025-05-09 DIAGNOSIS — F41.9 ANXIETY DISORDER, UNSPECIFIED: ICD-10-CM

## 2025-05-09 DIAGNOSIS — F90.0 ATTENTION-DEFICIT HYPERACTIVITY DISORDER, PREDOMINANTLY INATTENTIVE TYPE: ICD-10-CM

## 2025-05-09 DIAGNOSIS — F33.9 MAJOR DEPRESSIVE DISORDER, RECURRENT, UNSPECIFIED: ICD-10-CM

## 2025-05-09 PROCEDURE — 99214 OFFICE O/P EST MOD 30 MIN: CPT | Mod: 95

## 2025-05-09 PROCEDURE — 98007 SYNCH AUDIO-VIDEO EST HI 40: CPT

## 2025-05-09 RX ORDER — TRAZODONE HYDROCHLORIDE 50 MG/1
50 TABLET ORAL
Qty: 30 | Refills: 1 | Status: ACTIVE | COMMUNITY
Start: 2025-05-09 | End: 1900-01-01

## 2025-05-10 DIAGNOSIS — F33.9 MAJOR DEPRESSIVE DISORDER, RECURRENT, UNSPECIFIED: ICD-10-CM

## 2025-05-10 DIAGNOSIS — F41.9 ANXIETY DISORDER, UNSPECIFIED: ICD-10-CM

## 2025-05-10 DIAGNOSIS — F90.0 ATTENTION-DEFICIT HYPERACTIVITY DISORDER, PREDOMINANTLY INATTENTIVE TYPE: ICD-10-CM

## 2025-05-10 DIAGNOSIS — G47.00 INSOMNIA, UNSPECIFIED: ICD-10-CM

## 2025-05-21 ENCOUNTER — OUTPATIENT (OUTPATIENT)
Dept: OUTPATIENT SERVICES | Facility: HOSPITAL | Age: 25
LOS: 1 days | End: 2025-05-21

## 2025-05-21 ENCOUNTER — APPOINTMENT (OUTPATIENT)
Dept: NEUROPSYCHOLOGY | Facility: CLINIC | Age: 25
End: 2025-05-21

## 2025-05-21 DIAGNOSIS — F33.1 MAJOR DEPRESSIVE DISORDER, RECURRENT, MODERATE: ICD-10-CM

## 2025-06-06 ENCOUNTER — OUTPATIENT (OUTPATIENT)
Dept: OUTPATIENT SERVICES | Facility: HOSPITAL | Age: 25
LOS: 1 days | End: 2025-06-06
Payer: COMMERCIAL

## 2025-06-06 ENCOUNTER — APPOINTMENT (OUTPATIENT)
Dept: PSYCHIATRY | Facility: CLINIC | Age: 25
End: 2025-06-06
Payer: COMMERCIAL

## 2025-06-06 ENCOUNTER — APPOINTMENT (OUTPATIENT)
Dept: NEUROPSYCHOLOGY | Facility: CLINIC | Age: 25
End: 2025-06-06

## 2025-06-06 DIAGNOSIS — F33.9 MAJOR DEPRESSIVE DISORDER, RECURRENT, UNSPECIFIED: ICD-10-CM

## 2025-06-06 DIAGNOSIS — G47.00 INSOMNIA, UNSPECIFIED: ICD-10-CM

## 2025-06-06 DIAGNOSIS — F33.1 MAJOR DEPRESSIVE DISORDER, RECURRENT, MODERATE: ICD-10-CM

## 2025-06-06 DIAGNOSIS — F41.9 ANXIETY DISORDER, UNSPECIFIED: ICD-10-CM

## 2025-06-06 DIAGNOSIS — F90.0 ATTENTION-DEFICIT HYPERACTIVITY DISORDER, PREDOMINANTLY INATTENTIVE TYPE: ICD-10-CM

## 2025-06-06 PROCEDURE — 90837 PSYTX W PT 60 MINUTES: CPT | Mod: 95

## 2025-06-06 PROCEDURE — 98007 SYNCH AUDIO-VIDEO EST HI 40: CPT

## 2025-06-06 PROCEDURE — 99214 OFFICE O/P EST MOD 30 MIN: CPT | Mod: 95

## 2025-06-07 DIAGNOSIS — F90.0 ATTENTION-DEFICIT HYPERACTIVITY DISORDER, PREDOMINANTLY INATTENTIVE TYPE: ICD-10-CM

## 2025-06-07 DIAGNOSIS — F33.1 MAJOR DEPRESSIVE DISORDER, RECURRENT, MODERATE: ICD-10-CM

## 2025-06-07 DIAGNOSIS — F33.9 MAJOR DEPRESSIVE DISORDER, RECURRENT, UNSPECIFIED: ICD-10-CM

## 2025-06-07 DIAGNOSIS — F41.9 ANXIETY DISORDER, UNSPECIFIED: ICD-10-CM

## 2025-06-20 ENCOUNTER — APPOINTMENT (OUTPATIENT)
Dept: NEUROPSYCHOLOGY | Facility: CLINIC | Age: 25
End: 2025-06-20

## 2025-07-11 ENCOUNTER — APPOINTMENT (OUTPATIENT)
Dept: PSYCHIATRY | Facility: CLINIC | Age: 25
End: 2025-07-11

## 2025-07-18 ENCOUNTER — OUTPATIENT (OUTPATIENT)
Dept: OUTPATIENT SERVICES | Facility: HOSPITAL | Age: 25
LOS: 1 days | End: 2025-07-18
Payer: COMMERCIAL

## 2025-07-18 ENCOUNTER — APPOINTMENT (OUTPATIENT)
Dept: PSYCHIATRY | Facility: CLINIC | Age: 25
End: 2025-07-18
Payer: COMMERCIAL

## 2025-07-18 DIAGNOSIS — F41.9 ANXIETY DISORDER, UNSPECIFIED: ICD-10-CM

## 2025-07-18 PROCEDURE — 99214 OFFICE O/P EST MOD 30 MIN: CPT | Mod: 95

## 2025-07-18 PROCEDURE — 98007 SYNCH AUDIO-VIDEO EST HI 40: CPT

## 2025-07-19 DIAGNOSIS — F41.9 ANXIETY DISORDER, UNSPECIFIED: ICD-10-CM

## 2025-07-19 DIAGNOSIS — F90.0 ATTENTION-DEFICIT HYPERACTIVITY DISORDER, PREDOMINANTLY INATTENTIVE TYPE: ICD-10-CM

## 2025-07-19 DIAGNOSIS — F33.9 MAJOR DEPRESSIVE DISORDER, RECURRENT, UNSPECIFIED: ICD-10-CM

## 2025-07-21 RX ORDER — SERTRALINE 25 MG/1
25 TABLET, FILM COATED ORAL DAILY
Qty: 30 | Refills: 1 | Status: ACTIVE | COMMUNITY
Start: 2025-07-21 | End: 1900-01-01

## 2025-08-14 ENCOUNTER — APPOINTMENT (OUTPATIENT)
Dept: PSYCHIATRY | Facility: CLINIC | Age: 25
End: 2025-08-14

## 2025-08-19 ENCOUNTER — APPOINTMENT (OUTPATIENT)
Dept: PSYCHIATRY | Facility: CLINIC | Age: 25
End: 2025-08-19

## 2025-09-10 ENCOUNTER — TRANSCRIPTION ENCOUNTER (OUTPATIENT)
Age: 25
End: 2025-09-10